# Patient Record
Sex: MALE | Race: WHITE | Employment: FULL TIME | URBAN - NONMETROPOLITAN AREA
[De-identification: names, ages, dates, MRNs, and addresses within clinical notes are randomized per-mention and may not be internally consistent; named-entity substitution may affect disease eponyms.]

---

## 2016-12-09 LAB
BUN BLDV-MCNC: NORMAL MG/DL
CALCIUM SERPL-MCNC: NORMAL MG/DL
CHLORIDE BLD-SCNC: NORMAL MMOL/L
CHOLESTEROL, TOTAL: 198 MG/DL
CHOLESTEROL/HDL RATIO: 4
CO2: NORMAL MMOL/L
CREAT SERPL-MCNC: NORMAL MG/DL
GFR CALCULATED: NORMAL
GLUCOSE BLD-MCNC: 99 MG/DL
HDLC SERPL-MCNC: 59 MG/DL (ref 35–70)
LDL CHOLESTEROL CALCULATED: 134 MG/DL (ref 0–160)
POTASSIUM SERPL-SCNC: NORMAL MMOL/L
SODIUM BLD-SCNC: NORMAL MMOL/L
TRIGL SERPL-MCNC: 70 MG/DL
VLDLC SERPL CALC-MCNC: 14 MG/DL

## 2017-06-26 VITALS
DIASTOLIC BLOOD PRESSURE: 86 MMHG | SYSTOLIC BLOOD PRESSURE: 144 MMHG | HEART RATE: 68 BPM | HEIGHT: 69 IN | WEIGHT: 212 LBS | BODY MASS INDEX: 31.4 KG/M2

## 2017-06-26 DIAGNOSIS — C11.1 CANCER OF PHARYNGEAL TONSIL (HCC): ICD-10-CM

## 2017-06-26 DIAGNOSIS — E78.2 MIXED HYPERLIPIDEMIA: ICD-10-CM

## 2017-06-26 DIAGNOSIS — I10 UNSPECIFIED ESSENTIAL HYPERTENSION: ICD-10-CM

## 2017-06-26 DIAGNOSIS — I34.0 NON-RHEUMATIC MITRAL REGURGITATION: ICD-10-CM

## 2017-06-26 RX ORDER — AMLODIPINE BESYLATE 10 MG/1
10 TABLET ORAL NIGHTLY
COMMUNITY
End: 2017-12-11 | Stop reason: SDUPTHER

## 2017-06-26 RX ORDER — METOPROLOL TARTRATE 50 MG/1
50 TABLET, FILM COATED ORAL DAILY
COMMUNITY
End: 2018-10-29 | Stop reason: CLARIF

## 2017-06-26 RX ORDER — AZITHROMYCIN 250 MG/1
250 TABLET, FILM COATED ORAL DAILY
COMMUNITY
End: 2017-06-29 | Stop reason: ALTCHOICE

## 2017-06-26 RX ORDER — PREDNISONE 20 MG/1
20 TABLET ORAL DAILY
COMMUNITY
End: 2017-06-29 | Stop reason: ALTCHOICE

## 2017-06-29 ENCOUNTER — OFFICE VISIT (OUTPATIENT)
Dept: FAMILY MEDICINE CLINIC | Age: 57
End: 2017-06-29
Payer: COMMERCIAL

## 2017-06-29 VITALS
DIASTOLIC BLOOD PRESSURE: 86 MMHG | BODY MASS INDEX: 31.1 KG/M2 | WEIGHT: 210 LBS | HEART RATE: 48 BPM | SYSTOLIC BLOOD PRESSURE: 138 MMHG | HEIGHT: 69 IN

## 2017-06-29 DIAGNOSIS — I34.0 NON-RHEUMATIC MITRAL REGURGITATION: ICD-10-CM

## 2017-06-29 DIAGNOSIS — C11.1 CANCER OF PHARYNGEAL TONSIL (HCC): ICD-10-CM

## 2017-06-29 DIAGNOSIS — E78.2 MIXED HYPERLIPIDEMIA: ICD-10-CM

## 2017-06-29 DIAGNOSIS — I10 UNSPECIFIED ESSENTIAL HYPERTENSION: Primary | ICD-10-CM

## 2017-06-29 PROCEDURE — 99213 OFFICE O/P EST LOW 20 MIN: CPT | Performed by: FAMILY MEDICINE

## 2017-06-29 ASSESSMENT — PATIENT HEALTH QUESTIONNAIRE - PHQ9
1. LITTLE INTEREST OR PLEASURE IN DOING THINGS: 0
SUM OF ALL RESPONSES TO PHQ9 QUESTIONS 1 & 2: 0
SUM OF ALL RESPONSES TO PHQ QUESTIONS 1-9: 0
2. FEELING DOWN, DEPRESSED OR HOPELESS: 0

## 2017-07-03 ASSESSMENT — ENCOUNTER SYMPTOMS
WHEEZING: 0
ABDOMINAL DISTENTION: 0
CHEST TIGHTNESS: 0
SHORTNESS OF BREATH: 0
ABDOMINAL PAIN: 0
SORE THROAT: 0

## 2017-11-20 RX ORDER — METOPROLOL SUCCINATE 50 MG/1
TABLET, EXTENDED RELEASE ORAL
Qty: 90 TABLET | Refills: 3 | Status: SHIPPED | OUTPATIENT
Start: 2017-11-20 | End: 2018-10-29 | Stop reason: SDUPTHER

## 2017-12-11 DIAGNOSIS — I10 ESSENTIAL HYPERTENSION: Primary | ICD-10-CM

## 2017-12-11 RX ORDER — AMLODIPINE BESYLATE 10 MG/1
10 TABLET ORAL NIGHTLY
Qty: 90 TABLET | Refills: 3 | Status: SHIPPED | OUTPATIENT
Start: 2017-12-11 | End: 2018-10-29 | Stop reason: SDUPTHER

## 2017-12-11 NOTE — TELEPHONE ENCOUNTER
Shawnee Muñoz is calling to request a refill on the following medication(s):  Requested Prescriptions     Pending Prescriptions Disp Refills    amLODIPine (NORVASC) 10 MG tablet 90 tablet 3     Sig: Take 1 tablet by mouth nightly       Last Visit Date (If Applicable):  8/68/8991    Next Visit Date:    Visit date not found

## 2018-10-24 LAB
A/G RATIO: 1.6 RATIO
AGE FOR GFR: 59
ALBUMIN: 4.2 G/DL
ALK PHOSPHATASE: 84 UNITS/L
ALT SERPL-CCNC: 51 UNITS/L
ANION GAP SERPL CALCULATED.3IONS-SCNC: 12 MMOL/L
AST SERPL-CCNC: 24 UNITS/L
BASOPHILS # BLD: 0.1 THOU/MM3
BILIRUB SERPL-MCNC: 0.5 MG/DL
BUN BLDV-MCNC: 10 MG/DL
CALCIUM SERPL-MCNC: 9 MG/DL
CHLORIDE BLD-SCNC: 105 MMOL/L
CHOLESTEROL/HDL RATIO: 4.3 RATIO
CHOLESTEROL: 212 MG/DL
CO2: 29 MMOL/L
CREAT SERPL-MCNC: 0.8 MG/DL
DIFFERENTIAL: AUTOMATED DIFF
EGFR BF: 89 ML/MIN/1.73 M2
EGFR BM: 120 ML/MIN/1.73 M2
EGFR WF: 73 ML/MIN/1.73 M2
EGFR WM: 99 ML/MIN/1.73 M2
EOSINOPHIL # BLD: 0.21 THOU/MM3
GLOBULIN: 2.7 G/DL
GLUCOSE: 109 MG/DL
HCT VFR BLD CALC: 47.9 %
HDL, DIRECT: 49 MG/DL
HEMOGLOBIN: 16.5 G/DL
LDL CHOLESTEROL CALCULATED: 137.8 MG/DL
LYMPHOCYTES # BLD: 1.7 THOU/MM3
MCH RBC QN AUTO: 31.6 PG
MCHC RBC AUTO-ENTMCNC: 34.4 G/DL
MCV RBC AUTO: 91.6 FL
MONOCYTES # BLD: 0.56 THOU/MM3
NEUTROPHILS: 3.46 THOU/MM3
PDW BLD-RTO: 11.5 %
PLATELET # BLD: 278 THOU/MM3
PMV BLD AUTO: 8.3 FL
POTASSIUM SERPL-SCNC: 3.6 MMOL/L
RBC # BLD: 5.23 M/UL
SODIUM BLD-SCNC: 142 MMOL/L
TOTAL PROTEIN: 6.9 G/DL
TRIGL SERPL-MCNC: 126 MG/DL
VLDLC SERPL CALC-MCNC: 25 MG/DL
WBC # BLD: 6.02 THOU/ML3

## 2018-10-29 ENCOUNTER — OFFICE VISIT (OUTPATIENT)
Dept: FAMILY MEDICINE CLINIC | Age: 58
End: 2018-10-29
Payer: COMMERCIAL

## 2018-10-29 VITALS
HEIGHT: 69 IN | SYSTOLIC BLOOD PRESSURE: 122 MMHG | WEIGHT: 208 LBS | BODY MASS INDEX: 30.81 KG/M2 | OXYGEN SATURATION: 92 % | DIASTOLIC BLOOD PRESSURE: 86 MMHG | HEART RATE: 78 BPM

## 2018-10-29 DIAGNOSIS — M77.11 LATERAL EPICONDYLITIS OF RIGHT ELBOW: ICD-10-CM

## 2018-10-29 DIAGNOSIS — I10 ESSENTIAL HYPERTENSION: Primary | ICD-10-CM

## 2018-10-29 DIAGNOSIS — E78.2 MIXED HYPERLIPIDEMIA: ICD-10-CM

## 2018-10-29 DIAGNOSIS — R73.01 IMPAIRED FASTING GLUCOSE: ICD-10-CM

## 2018-10-29 DIAGNOSIS — Z11.59 NEED FOR HEPATITIS C SCREENING TEST: ICD-10-CM

## 2018-10-29 DIAGNOSIS — Z92.3 HISTORY OF RADIATION TO HEAD AND NECK REGION: ICD-10-CM

## 2018-10-29 DIAGNOSIS — I34.0 NON-RHEUMATIC MITRAL REGURGITATION: ICD-10-CM

## 2018-10-29 DIAGNOSIS — C11.1 CANCER OF PHARYNGEAL TONSIL (HCC): ICD-10-CM

## 2018-10-29 LAB
HBA1C MFR BLD: 5.4 %
HEPATITIS C IGG: NORMAL
SIGNAL/CUTOFF: NORMAL
TSH REFLEX FT4: 1.02 MIU/ML

## 2018-10-29 PROCEDURE — 99214 OFFICE O/P EST MOD 30 MIN: CPT | Performed by: FAMILY MEDICINE

## 2018-10-29 PROCEDURE — 83036 HEMOGLOBIN GLYCOSYLATED A1C: CPT | Performed by: FAMILY MEDICINE

## 2018-10-29 RX ORDER — METOPROLOL SUCCINATE 50 MG/1
TABLET, EXTENDED RELEASE ORAL
Qty: 90 TABLET | Refills: 3 | Status: SHIPPED | OUTPATIENT
Start: 2018-10-29 | End: 2018-11-09 | Stop reason: SDUPTHER

## 2018-10-29 RX ORDER — AMLODIPINE BESYLATE 10 MG/1
10 TABLET ORAL NIGHTLY
Qty: 90 TABLET | Refills: 3 | Status: SHIPPED | OUTPATIENT
Start: 2018-10-29 | End: 2019-02-25 | Stop reason: SDUPTHER

## 2018-10-29 ASSESSMENT — PATIENT HEALTH QUESTIONNAIRE - PHQ9
SUM OF ALL RESPONSES TO PHQ QUESTIONS 1-9: 0
SUM OF ALL RESPONSES TO PHQ QUESTIONS 1-9: 0
2. FEELING DOWN, DEPRESSED OR HOPELESS: 0
SUM OF ALL RESPONSES TO PHQ9 QUESTIONS 1 & 2: 0
1. LITTLE INTEREST OR PLEASURE IN DOING THINGS: 0

## 2018-10-30 DIAGNOSIS — Z11.59 NEED FOR HEPATITIS C SCREENING TEST: ICD-10-CM

## 2018-10-31 NOTE — PROGRESS NOTES
[facility]  [address]  Dept: [phone]  Dept Fax: [phone]    Dev Agudelo is a 59 y.o. male who presents today for his medical conditions/complaints as noted below. Dev Agudelo is c/o of Annual Exam (pt c/o right forearm pain states tendon on top is sore, otherwise no issues or complaints.); Discuss Labs; Hyperlipidemia (denies any issues); and Hypertension      HPI:     The patient is 59years old, here for routine checkup. He comes in yearly. Hypertension  He remains on amlodipine 10 mg and metoprolol succinate 50 mg. His blood pressure for us is 122/86. He does not check. He has no chest pain or chest pressure. No palpitations. No edema. No orthopnea or paroxysmal nocturnal dyspnea. Hyperlipidemia  His cholesterol is not treated. He did another cholesterol for us with a total cholesterol 212, HDL 49, . He is asymptomatic. In the past, we had considered treating this but he has always deferred. He tries to watch his diet and exercise but does not get much regular exercise other than walking a dog. 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 He has been released by ENT and by Radiation Oncology. He has no symptoms. No sore throat. He has no complaints of neck pain or swelling in his neck. No swollen glands. No difficulty swallowing. No night sweats, fevers or chills. He is also complaining of some right elbow pain. Insidious onset. Exquisite pain with arm extended and wrist dorsiflexed    Current Outpatient Prescriptions   Medication Sig Dispense Refill    amLODIPine (NORVASC) 10 MG tablet Take 1 tablet by mouth nightly 90 tablet 3    metoprolol succinate (TOPROL XL) 50 MG extended release tablet TAKE 1 TABLET ONCE DAILY 90 tablet 3     No current facility-administered medications for this visit.       Allergies   Allergen Reactions    No Known Allergies        Past Medical History:   Diagnosis Date    Hyperlipidemia     Hypertension     SCC (squamous cell carcinoma) 08/2011    R Tonsil      Past Surgical History:   Procedure Laterality Date    COLONOSCOPY  01/2009    Dr Alessandra Arce Normal    TONSILLECTOMY Right 08/10/2011    R Tonsil Dr Komal Melgoza     Family History   Problem Relation Age of Onset    Osteoporosis Mother     Coronary Art Dis Father      Social History   Substance Use Topics    Smoking status: Never Smoker    Smokeless tobacco: Never Used    Alcohol use No        Health Maintenance   Topic Date Due    HIV screen  10/30/1973    DTaP/Tdap/Td vaccine (1 - Tdap) 10/30/1977    Shingles Vaccine (1 of 2 - 2 Dose Series) 01/12/2014    Colon cancer screen colonoscopy  01/15/2019    Potassium monitoring  10/24/2019    Creatinine monitoring  10/24/2019    Diabetes screen  10/29/2021    Lipid screen  10/24/2023    Flu vaccine  Completed    Hepatitis C screen  Completed       BP Readings from Last 3 Encounters:   10/29/18 122/86   06/29/17 138/86   12/20/16 (!) 144/86          (Goal 120/80)    Lab Results   Component Value Date    K 3.6 10/24/2018    CREATININE 0.8 10/24/2018    AST 24 10/24/2018    ALT 51 10/24/2018    HCT 47.9 10/24/2018    LABA1C 5.4 10/29/2018    GLUCOSE 109 10/24/2018    CALCIUM 9.0 10/24/2018      Lab Results   Component Value Date    CHOL 212 10/24/2018    CHOL 198 12/09/2016    TRIG 126 10/24/2018    HDL 59 12/09/2016       Review of Systems:      Review of Systems   Constitutional: Negative for chills, diaphoresis and fever. HENT: Negative for mouth sores, sore throat, trouble swallowing and voice change. Respiratory: Negative for chest tightness, shortness of breath and wheezing. Cardiovascular: Negative for chest pain, palpitations and leg swelling. Gastrointestinal: Negative for abdominal distention, abdominal pain and blood in stool. Genitourinary: Negative for difficulty urinating, dysuria and hematuria.    Hematological:

## 2018-11-02 ASSESSMENT — ENCOUNTER SYMPTOMS
VOICE CHANGE: 0
ABDOMINAL PAIN: 0
CHEST TIGHTNESS: 0
WHEEZING: 0
TROUBLE SWALLOWING: 0
SHORTNESS OF BREATH: 0
SORE THROAT: 0
ABDOMINAL DISTENTION: 0
BLOOD IN STOOL: 0

## 2018-11-09 DIAGNOSIS — I10 ESSENTIAL HYPERTENSION: ICD-10-CM

## 2018-11-09 RX ORDER — METOPROLOL SUCCINATE 50 MG/1
TABLET, EXTENDED RELEASE ORAL
Qty: 90 TABLET | Refills: 3 | Status: SHIPPED | OUTPATIENT
Start: 2018-11-09 | End: 2020-01-06

## 2019-02-25 DIAGNOSIS — I10 ESSENTIAL HYPERTENSION: ICD-10-CM

## 2019-02-25 RX ORDER — AMLODIPINE BESYLATE 10 MG/1
10 TABLET ORAL NIGHTLY
Qty: 90 TABLET | Refills: 3 | Status: SHIPPED | OUTPATIENT
Start: 2019-02-25 | End: 2020-02-24

## 2019-08-06 LAB
A/G RATIO: 1.7 RATIO
AGE FOR GFR: 60
ALBUMIN: 4.5 G/DL (ref 3.5–5)
ALK PHOSPHATASE: 93 UNITS/L (ref 38–126)
ALT SERPL-CCNC: 39 UNITS/L (ref 21–72)
ANION GAP SERPL CALCULATED.3IONS-SCNC: 13 MMOL/L
AST SERPL-CCNC: 25 UNITS/L (ref 17–59)
BASOPHILS # BLD: 0.09 THOU/MM3 (ref 0–0.3)
BILIRUB SERPL-MCNC: 0.7 MG/DL (ref 0.2–1.3)
BUN BLDV-MCNC: 12 MG/DL (ref 9–20)
CALCIUM SERPL-MCNC: 8.9 MG/DL (ref 8.4–10.2)
CHLORIDE BLD-SCNC: 106 MMOL/L (ref 98–120)
CHOLESTEROL/HDL RATIO: 4.2 RATIO (ref 0–4.5)
CHOLESTEROL: 227 MG/DL (ref 50–200)
CO2: 27 MMOL/L (ref 22–31)
CREAT SERPL-MCNC: 0.8 MG/DL (ref 0.7–1.3)
DIFFERENTIAL: AUTOMATED DIFF
EGFR BF: 89 ML/MIN/1.73 M2
EGFR BM: 120 ML/MIN/1.73 M2
EGFR WF: 73 ML/MIN/1.73 M2
EGFR WM: 99 ML/MIN/1.73 M2
EOSINOPHIL # BLD: 0.23 THOU/MM3 (ref 0–1.1)
GLOBULIN: 2.6 G/DL
GLUCOSE: 108 MG/DL (ref 75–110)
HCT VFR BLD CALC: 48.3 % (ref 42–52)
HDL, DIRECT: 54 MG/DL (ref 36–68)
HEMOGLOBIN: 16.2 G/DL (ref 13.8–17)
LDL CHOLESTEROL CALCULATED: 150.6 MG/DL (ref 0–160)
LYMPHOCYTES # BLD: 1.69 THOU/MM3 (ref 1–5.5)
MCH RBC QN AUTO: 30.9 PG (ref 28.5–32)
MCHC RBC AUTO-ENTMCNC: 33.6 G/DL (ref 32–37)
MCV RBC AUTO: 91.9 FL (ref 80–94)
MONOCYTES # BLD: 0.55 THOU/MM3 (ref 0.1–1)
NEUTROPHILS: 3.52 THOU/MM3 (ref 2–8.1)
PDW BLD-RTO: 11.4 % (ref 10–15)
PLATELET # BLD: 256 THOU/MM3 (ref 130–400)
PMV BLD AUTO: 8.2 FL (ref 7.4–11)
POTASSIUM SERPL-SCNC: 3.5 MMOL/L (ref 3.6–5)
RBC # BLD: 5.26 M/UL (ref 4.7–6.1)
SODIUM BLD-SCNC: 142 MMOL/L (ref 135–145)
TOTAL PROTEIN: 7.1 G/DL (ref 6.3–8.2)
TRIGL SERPL-MCNC: 112 MG/DL (ref 10–250)
VLDLC SERPL CALC-MCNC: 22 MG/DL (ref 0–40)
WBC # BLD: 6.08 THOU/ML3 (ref 4.8–10)

## 2019-08-11 ASSESSMENT — ENCOUNTER SYMPTOMS
WHEEZING: 0
TROUBLE SWALLOWING: 0
BLOOD IN STOOL: 0
VOICE CHANGE: 0
SORE THROAT: 0
ABDOMINAL PAIN: 0
SHORTNESS OF BREATH: 0
ABDOMINAL DISTENTION: 0
CHEST TIGHTNESS: 0

## 2019-08-12 ENCOUNTER — OFFICE VISIT (OUTPATIENT)
Dept: FAMILY MEDICINE CLINIC | Age: 59
End: 2019-08-12
Payer: COMMERCIAL

## 2019-08-12 VITALS
WEIGHT: 211 LBS | OXYGEN SATURATION: 98 % | DIASTOLIC BLOOD PRESSURE: 80 MMHG | HEART RATE: 62 BPM | SYSTOLIC BLOOD PRESSURE: 136 MMHG | BODY MASS INDEX: 31.16 KG/M2

## 2019-08-12 DIAGNOSIS — I10 ESSENTIAL HYPERTENSION: Primary | ICD-10-CM

## 2019-08-12 DIAGNOSIS — R73.01 IMPAIRED FASTING GLUCOSE: ICD-10-CM

## 2019-08-12 DIAGNOSIS — I34.0 NON-RHEUMATIC MITRAL REGURGITATION: ICD-10-CM

## 2019-08-12 DIAGNOSIS — C11.1 CANCER OF PHARYNGEAL TONSIL (HCC): ICD-10-CM

## 2019-08-12 DIAGNOSIS — Z12.11 ENCOUNTER FOR SCREENING COLONOSCOPY: ICD-10-CM

## 2019-08-12 DIAGNOSIS — E78.2 MIXED HYPERLIPIDEMIA: ICD-10-CM

## 2019-08-12 DIAGNOSIS — Z23 NEED FOR TETANUS BOOSTER: ICD-10-CM

## 2019-08-12 LAB — HBA1C MFR BLD: 5.4 %

## 2019-08-12 PROCEDURE — 90471 IMMUNIZATION ADMIN: CPT | Performed by: FAMILY MEDICINE

## 2019-08-12 PROCEDURE — 90715 TDAP VACCINE 7 YRS/> IM: CPT | Performed by: FAMILY MEDICINE

## 2019-08-12 PROCEDURE — 99214 OFFICE O/P EST MOD 30 MIN: CPT | Performed by: FAMILY MEDICINE

## 2019-08-12 PROCEDURE — 83036 HEMOGLOBIN GLYCOSYLATED A1C: CPT | Performed by: FAMILY MEDICINE

## 2019-08-12 ASSESSMENT — PATIENT HEALTH QUESTIONNAIRE - PHQ9
SUM OF ALL RESPONSES TO PHQ QUESTIONS 1-9: 0
SUM OF ALL RESPONSES TO PHQ9 QUESTIONS 1 & 2: 0
1. LITTLE INTEREST OR PLEASURE IN DOING THINGS: 0
SUM OF ALL RESPONSES TO PHQ QUESTIONS 1-9: 0
2. FEELING DOWN, DEPRESSED OR HOPELESS: 0

## 2019-09-16 ENCOUNTER — CLINICAL DOCUMENTATION (OUTPATIENT)
Dept: OTHER | Facility: CLINIC | Age: 59
End: 2019-09-16

## 2019-09-17 LAB — PATHOLOGY REPORT: NORMAL

## 2019-09-24 ENCOUNTER — OFFICE VISIT (OUTPATIENT)
Dept: SURGERY | Age: 59
End: 2019-09-24
Payer: COMMERCIAL

## 2019-09-24 VITALS
TEMPERATURE: 98.9 F | DIASTOLIC BLOOD PRESSURE: 95 MMHG | BODY MASS INDEX: 30.86 KG/M2 | HEART RATE: 66 BPM | SYSTOLIC BLOOD PRESSURE: 160 MMHG | WEIGHT: 209 LBS

## 2019-09-24 DIAGNOSIS — K62.1 RECTAL POLYP: Primary | ICD-10-CM

## 2019-09-24 PROCEDURE — 99213 OFFICE O/P EST LOW 20 MIN: CPT | Performed by: SURGERY

## 2019-09-24 NOTE — PROGRESS NOTES
Cholo Moses is a 59 y.o. male who presents today for colonoscopy. Patient underwent colonoscopy 1 week ago for screening purposes and was found to have 2 polyps in the colon as well as a low rectal mass. The colon polyps were removed and biopsies were taken of the rectal mass. Pathology returned as one tubular adenoma, one hyperplastic polyp in the rectal mass on pathology showed as a juvenile polyp. He returns the office today to discuss the results and for further planning. Since last being seen he denies any new issues or problems. Past Medical History:   Diagnosis Date    Hyperlipidemia     Hypertension     SCC (squamous cell carcinoma) 08/2011    R Tonsil       Past Surgical History:   Procedure Laterality Date    COLONOSCOPY  01/2009    Dr Olga Knox Normal    COLONOSCOPY  09/16/2019    TONSILLECTOMY Right 08/10/2011    R Tonsil Dr Naresh Jiménez       Current Outpatient Medications   Medication Sig Dispense Refill    amLODIPine (NORVASC) 10 MG tablet Take 1 tablet by mouth nightly 90 tablet 3    metoprolol succinate (TOPROL XL) 50 MG extended release tablet TAKE 1 TABLET ONCE DAILY 90 tablet 3     No current facility-administered medications for this visit.         Allergies   Allergen Reactions    No Known Allergies        Family History   Problem Relation Age of Onset    Osteoporosis Mother     Coronary Art Dis Father     Heart Disease Father     Heart Attack Father        Social History     Socioeconomic History    Marital status:      Spouse name: Not on file    Number of children: Not on file    Years of education: Not on file    Highest education level: Not on file   Occupational History    Not on file   Social Needs    Financial resource strain: Not on file    Food insecurity:     Worry: Not on file     Inability: Not on file    Transportation needs:     Medical: Not on file     Non-medical: Not on file   Tobacco Use    Smoking status: Never Smoker    Smokeless

## 2020-01-06 RX ORDER — METOPROLOL SUCCINATE 50 MG/1
TABLET, EXTENDED RELEASE ORAL
Qty: 90 TABLET | Refills: 3 | Status: SHIPPED | OUTPATIENT
Start: 2020-01-06 | End: 2021-02-03 | Stop reason: SDUPTHER

## 2020-01-06 NOTE — TELEPHONE ENCOUNTER
Kamini Garcia is calling to request a refill on the following medication(s):  Requested Prescriptions     Pending Prescriptions Disp Refills    metoprolol succinate (TOPROL XL) 50 MG extended release tablet [Pharmacy Med Name: METOPROLO ER TAB SUC 50MG] 90 tablet 3     Sig: TAKE 1 TABLET ONCE DAILY       Last Visit Date (If Applicable):  1/96/8429    Next Visit Date:    2/13/2020

## 2020-02-13 ENCOUNTER — OFFICE VISIT (OUTPATIENT)
Dept: FAMILY MEDICINE CLINIC | Age: 60
End: 2020-02-13
Payer: COMMERCIAL

## 2020-02-13 VITALS
SYSTOLIC BLOOD PRESSURE: 126 MMHG | DIASTOLIC BLOOD PRESSURE: 82 MMHG | HEART RATE: 55 BPM | WEIGHT: 210 LBS | BODY MASS INDEX: 31.1 KG/M2 | HEIGHT: 69 IN | OXYGEN SATURATION: 98 %

## 2020-02-13 PROBLEM — R73.01 IMPAIRED FASTING GLUCOSE: Status: ACTIVE | Noted: 2020-02-13

## 2020-02-13 LAB — HBA1C MFR BLD: 5.5 %

## 2020-02-13 PROCEDURE — 99214 OFFICE O/P EST MOD 30 MIN: CPT | Performed by: FAMILY MEDICINE

## 2020-02-13 PROCEDURE — 83036 HEMOGLOBIN GLYCOSYLATED A1C: CPT | Performed by: FAMILY MEDICINE

## 2020-02-13 ASSESSMENT — ENCOUNTER SYMPTOMS
BLOOD IN STOOL: 0
TROUBLE SWALLOWING: 0
WHEEZING: 0
SORE THROAT: 0
VOICE CHANGE: 0
ABDOMINAL DISTENTION: 0
COUGH: 0
SHORTNESS OF BREATH: 0
ABDOMINAL PAIN: 0

## 2020-02-13 ASSESSMENT — PATIENT HEALTH QUESTIONNAIRE - PHQ9
SUM OF ALL RESPONSES TO PHQ QUESTIONS 1-9: 0
2. FEELING DOWN, DEPRESSED OR HOPELESS: 0
SUM OF ALL RESPONSES TO PHQ QUESTIONS 1-9: 0
SUM OF ALL RESPONSES TO PHQ9 QUESTIONS 1 & 2: 0
1. LITTLE INTEREST OR PLEASURE IN DOING THINGS: 0

## 2020-02-13 NOTE — PROGRESS NOTES
[address]  Dept: [phone]  DeptFax: [phone]    Lucinda Chi is a57 y.o. male who presents today for his medical conditions/complaints as noted below. Lucinda Chi is c/o of 6 Month Follow-Up (denies any issues does report an occasional right hip pain); Hypertension; Blood Sugar Problem; and Hyperlipidemia      HPI:     HPI    Hypertension  He remains on amlodipine 10 mg and metoprolol succinate 50 mg.  His blood pressure for us is  126/82. He does not check. [facility] has no chest pain or chest pressure. No palpitations. No orthopnea or paroxysmal nocturnal dyspnea.  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 But it goes down overnight.     Hyperlipidemia   his last lipid profile was in August 2018 which showed a total cholesterol of  227 , HDL 54 ,  .  He is asymptomatic.  In the past, we had considered treating this but he has always deferred. He tries to watch his diet and exercise. 6 months ago his estimated risk was around 11 to 12%. Impaired fasting glucose  He has had a fasting glucose of 108 but his hemoglobin A1c was 5.4 at last visit. Today's is 5.5 he has been trying to watch his diet. And also walk on the treadmill 30 minutes trying every day.     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 [facility] has been released by ENT and by Radiation Oncology. [facility] has no symptoms. No sore throat.  He has no complaints of neck pain or swelling in his neck. No swollen glands.  No difficulty swallowing. No night sweats, fevers or chills. Colonoscopy  Patient underwent a colonoscopy in September of last year. He was found to have a large juvenile polyp in the rectum. With Dr. Zander Carr    1. I had a discussion with Derik Jaquez today about the pathology results and that there was no evidence of cancer on pathology.   I also discussed with him that unfortunately due to the location and the size of the rectal polyp we were not able to completely remove it. I did discuss with him that while juvenile polyps have low risk of conversion to malignancy that there is some potential that that could happen. Additionally due to the fact that this was a fairly large polyp there would be concern for continued growth with eventual concern for partial obstruction. For these reasons I have recommended that he undergo transanal excision of the polyp at his convenience. Risks of the procedure including bleeding, infection, scarring, pain, damage to surrounding tissues including sphincter muscles, anal incontinence and anesthesia risk were discussed and consent is obtained. Patient subsequently underwent a transanal removal of a rectal polyp. In November 2019.   Pathology confirmed a juvenile polyp without evidence of malignancy    BP Readings from Last 3 Encounters:   02/13/20 126/82   09/24/19 (!) 160/95   08/12/19 136/80            (goal 120/80)    Past Medical History:   Diagnosis Date    Hyperlipidemia     Hypertension     Rectal polyp 09/2019    Dr. Lisy Panda polyp    SCC (squamous cell carcinoma) 08/2011    R Tonsil      Past Surgical History:   Procedure Laterality Date    COLONOSCOPY  01/2009    Dr Iron Haque Normal    COLONOSCOPY  09/16/2019    EXCISION OF AURAL MASS      TONSILLECTOMY Right 08/10/2011    R Tonsil Dr Keyanna Guillermo     Family History   Problem Relation Age of Onset    Osteoporosis Mother     Coronary Art Dis Father     Heart Disease Father     Heart Attack Father      Social History     Tobacco Use    Smoking status: Never Smoker    Smokeless tobacco: Never Used   Substance Use Topics    Alcohol use: Yes        Current Outpatient Medications   Medication Sig Dispense Refill    metoprolol succinate (TOPROL XL) 50 MG extended release tablet TAKE 1 TABLET ONCE DAILY 90 tablet 3    amLODIPine (NORVASC) 10 MG tablet Take Head: Normocephalic and atraumatic. Right Ear: Tympanic membrane normal.      Left Ear: Tympanic membrane normal.      Nose: Nose normal.      Mouth/Throat:      Pharynx: No posterior oropharyngeal erythema. Neck:      Musculoskeletal: Neck supple. Thyroid: No thyromegaly. Vascular: No carotid bruit. Cardiovascular:      Rate and Rhythm: Normal rate and regular rhythm. Heart sounds: Normal heart sounds, S1 normal and S2 normal. No murmur. Pulmonary:      Breath sounds: Normal breath sounds. No wheezing, rhonchi or rales. Abdominal:      General: Bowel sounds are normal.      Palpations: Abdomen is soft. Tenderness: There is no abdominal tenderness. Musculoskeletal: Normal range of motion. Right forearm: He exhibits tenderness. He exhibits no bony tenderness, no swelling, no edema and no deformity. Lymphadenopathy:      Cervical: No cervical adenopathy. Skin:     Findings: No rash. Assessment:      Diagnosis Orders   1. Essential hypertension  CBC Auto Differential    Comprehensive Metabolic Panel, Fasting   2. Cancer of pharyngeal tonsil ([address]     3. Mixed hyperlipidemia  Lipid Panel   4. Impaired fasting glucose  POCT glycosylated hemoglobin (Hb A1C)   5. 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 Screening PSA (prostate specific antigen)  PSA Screening            POC Testing Results (If Applicable):  Results for POC orders placed in visit on 02/13/20   POCT glycosylated hemoglobin (Hb A1C)   Result Value Ref Range    Hemoglobin A1C 5.5 %       Plan:     Continue current medications and lifestyle changes. We will get labs in 6 months CBC CMP lipid panel and will add a screening PSA. His dependent edema seems benign. He might try some compression stockings over-the-counter. Otherwise he will return the office  in 6 months.   Sooner if any problems    Orders Given:  Orders Placed This Encounter Procedures    CBC Auto Differential     Standing Status:   Future     Standing Expiration Date:   2/12/2021    Comprehensive Metabolic Panel, Fasting     Standing Status:   Future     Standing Expiration Date:   2/12/2021    Lipid Panel     Standing Status:   Future     Standing Expiration Date:   2/12/2021     Order Specific Question:   Is Patient Fasting?/# of Hours     Answer:   Yes, 12 hours    PSA Screening     Standing Status:   Future     Standing Expiration Date:   2/13/2021    POCT glycosylated hemoglobin (Hb A1C)     Prescriptions:    No orders of the defined types were placed in this encounter. Return in about 6 months (around 8/13/2020). Electronically signed by Blu Soler MD on2/13/2020. **This report has been created using voice recognition software. It may contain minor errors which are inherent in voice recognition technology. **

## 2020-02-24 RX ORDER — AMLODIPINE BESYLATE 10 MG/1
TABLET ORAL
Qty: 90 TABLET | Refills: 3 | Status: SHIPPED | OUTPATIENT
Start: 2020-02-24 | End: 2021-03-18 | Stop reason: SDUPTHER

## 2020-04-07 ENCOUNTER — OFFICE VISIT (OUTPATIENT)
Dept: FAMILY MEDICINE CLINIC | Age: 60
End: 2020-04-07
Payer: COMMERCIAL

## 2020-04-07 VITALS
WEIGHT: 210 LBS | HEART RATE: 69 BPM | OXYGEN SATURATION: 98 % | SYSTOLIC BLOOD PRESSURE: 136 MMHG | DIASTOLIC BLOOD PRESSURE: 98 MMHG | BODY MASS INDEX: 31.01 KG/M2

## 2020-04-07 PROCEDURE — 99213 OFFICE O/P EST LOW 20 MIN: CPT | Performed by: FAMILY MEDICINE

## 2020-04-07 RX ORDER — IBUPROFEN 200 MG
TABLET ORAL
Qty: 120 TABLET | Refills: 3 | COMMUNITY
Start: 2020-04-07 | End: 2021-03-24 | Stop reason: ALTCHOICE

## 2020-04-07 RX ORDER — CAMPHOR, MENTHOL, AND METHYL SALICYLATE 7.1; 33; 36 MG/1; MG/1; MG/1
PATCH TOPICAL
Qty: 10 PATCH | Refills: 0 | COMMUNITY
Start: 2020-04-07 | End: 2021-02-03 | Stop reason: ALTCHOICE

## 2020-04-07 RX ORDER — CYCLOBENZAPRINE HCL 10 MG
10 TABLET ORAL 3 TIMES DAILY PRN
Qty: 30 TABLET | Refills: 0 | Status: SHIPPED | OUTPATIENT
Start: 2020-04-07 | End: 2020-04-17

## 2020-04-07 ASSESSMENT — ENCOUNTER SYMPTOMS
RESPIRATORY NEGATIVE: 1
NAUSEA: 0
CONSTIPATION: 0
VOMITING: 0
BACK PAIN: 1
ABDOMINAL PAIN: 0
BLOOD IN STOOL: 0

## 2021-02-03 ENCOUNTER — OFFICE VISIT (OUTPATIENT)
Dept: FAMILY MEDICINE CLINIC | Age: 61
End: 2021-02-03
Payer: COMMERCIAL

## 2021-02-03 VITALS
SYSTOLIC BLOOD PRESSURE: 130 MMHG | WEIGHT: 216 LBS | HEIGHT: 69 IN | OXYGEN SATURATION: 98 % | DIASTOLIC BLOOD PRESSURE: 88 MMHG | BODY MASS INDEX: 31.99 KG/M2 | HEART RATE: 70 BPM

## 2021-02-03 DIAGNOSIS — M25.551 CHRONIC HIP PAIN, RIGHT: ICD-10-CM

## 2021-02-03 DIAGNOSIS — Z13.1 SCREENING FOR DIABETES MELLITUS: ICD-10-CM

## 2021-02-03 DIAGNOSIS — G89.29 CHRONIC RIGHT SHOULDER PAIN: ICD-10-CM

## 2021-02-03 DIAGNOSIS — M25.511 CHRONIC RIGHT SHOULDER PAIN: ICD-10-CM

## 2021-02-03 DIAGNOSIS — I10 ESSENTIAL HYPERTENSION: Primary | ICD-10-CM

## 2021-02-03 DIAGNOSIS — G89.29 CHRONIC HIP PAIN, RIGHT: ICD-10-CM

## 2021-02-03 DIAGNOSIS — E78.2 MIXED HYPERLIPIDEMIA: ICD-10-CM

## 2021-02-03 DIAGNOSIS — R73.01 IMPAIRED FASTING GLUCOSE: ICD-10-CM

## 2021-02-03 DIAGNOSIS — C11.1 CANCER OF PHARYNGEAL TONSIL (HCC): ICD-10-CM

## 2021-02-03 DIAGNOSIS — Z13.220 SCREENING, LIPID: ICD-10-CM

## 2021-02-03 PROCEDURE — 99214 OFFICE O/P EST MOD 30 MIN: CPT | Performed by: FAMILY MEDICINE

## 2021-02-03 RX ORDER — METOPROLOL SUCCINATE 100 MG/1
TABLET, EXTENDED RELEASE ORAL
Qty: 90 TABLET | Refills: 1 | Status: SHIPPED | OUTPATIENT
Start: 2021-02-03 | End: 2021-08-02 | Stop reason: SDUPTHER

## 2021-02-03 SDOH — ECONOMIC STABILITY: FOOD INSECURITY: WITHIN THE PAST 12 MONTHS, YOU WORRIED THAT YOUR FOOD WOULD RUN OUT BEFORE YOU GOT MONEY TO BUY MORE.: NEVER TRUE

## 2021-02-03 SDOH — ECONOMIC STABILITY: TRANSPORTATION INSECURITY
IN THE PAST 12 MONTHS, HAS THE LACK OF TRANSPORTATION KEPT YOU FROM MEDICAL APPOINTMENTS OR FROM GETTING MEDICATIONS?: NO

## 2021-02-03 ASSESSMENT — PATIENT HEALTH QUESTIONNAIRE - PHQ9
2. FEELING DOWN, DEPRESSED OR HOPELESS: 0
SUM OF ALL RESPONSES TO PHQ QUESTIONS 1-9: 0
SUM OF ALL RESPONSES TO PHQ QUESTIONS 1-9: 0
1. LITTLE INTEREST OR PLEASURE IN DOING THINGS: 0

## 2021-02-03 ASSESSMENT — ENCOUNTER SYMPTOMS: SHORTNESS OF BREATH: 0

## 2021-02-03 NOTE — PROGRESS NOTES
[address] [address]  Dept: [phone]  Dept Fax: [phone]    Aric Patricio is a 56 y.o. male who presents today for his medical conditions/complaints as noted below. Aric Patricio c/o of Establish Care      HPI:     HPI  Pt here to get established, prior pt of Dr Urbano Zavala now retired  Care for HTN, hyperlipidemia, impaired fasting glucose prior. Leg swelling noted if standing long, more on feet a lot or on airplane. Has compression stockings to use    Right hip and shoulder pains noted intermittantly, no active treatments attempted. Noting more sedentary and more weight gain after cancer treatment- doing well remission over 2 years    Left handed, active with right. Maternal aunts x 2 with severe arthritis. BP Readings from Last 3 Encounters:   02/03/21 130/88   04/07/20 (!) 136/98   02/13/20 126/82          (goal 120/80)    Past Medical History:   Diagnosis Date    Hyperlipidemia     Hypertension     Rectal polyp 09/2019    Dr. Hanley Molt polyp    SCC (squamous cell carcinoma) 08/2011    R Tonsil      Past Surgical History:   Procedure Laterality Date    COLONOSCOPY  01/2009    Dr Mari Bailey Normal    COLONOSCOPY  09/16/2019    EXCISION OF AURAL MASS  03/2020    TONSILLECTOMY Right 08/10/2011    R Tonsil Dr Lillie Perkins       Family History   Problem Relation Age of Onset    Osteoporosis Mother     Dementia Mother     Coronary Art Dis Father     Heart Disease Father     Heart Attack Father     Blindness Paternal Grandfather 76       Social History     Tobacco Use    Smoking status: Never Smoker    Smokeless tobacco: Never Used   Substance Use Topics    Alcohol use: Yes      Prior to Visit Medications    Medication Sig Taking?  Authorizing Provider   ibuprofen (ADVIL;MOTRIN) 200 MG tablet Up to 4 tablets 3 times / day  Deo Braswell MD Vascular: No carotid bruit. Cardiovascular:      Rate and Rhythm: Normal rate and regular rhythm. Heart sounds: No murmur. Pulmonary:      Effort: Pulmonary effort is normal. No respiratory distress. Musculoskeletal:         General: Tenderness (left shoulder normal, right mild rotator cuff tenderness anterior/posterior.) present. No swelling. Lymphadenopathy:      Cervical: No cervical adenopathy. Neurological:      Mental Status: He is alert. Psychiatric:         Thought Content: Thought content normal.         Judgment: Judgment normal.       Weight up 6 # from prior max of couple years  The 10-year ASCVD risk score (Irvin Galindo, et al., 2013) is: 12.7%    Values used to calculate the score:      Age: 56 years      Sex: Male      Is Non- : No      Diabetic: No      Tobacco smoker: No      Systolic Blood Pressure: 479 mmHg      Is BP treated: Yes      HDL Cholesterol: 54 mg/dL      Total Cholesterol: 227 mg/dL    Assessment:     1. Essential hypertension    2. Cancer of pharyngeal tonsil ([address]    3. Mixed hyperlipidemia    4. Impaired fasting glucose    5. Screening, lipid    6. Screening for diabetes mellitus    7. Chronic right shoulder pain    8. Chronic hip pain, right      No results found for this visit on 02/03/21.         Plan:     Orders Placed This Encounter   Procedures    XR SHOULDER RIGHT (MIN 2 VIEWS)     Standing Status:   Future     Standing Expiration Date:   2/3/2022    XR HIP 2-3 VW W PELVIS RIGHT     Standing Status:   Future     Standing Expiration Date:   2/3/2022    Lipid Panel     Standing Status:   Future     Standing Expiration Date:   2/3/2022     Order Specific Question:   Is Patient Fasting?/# of Hours     Answer:   10-12    Glucose, Fasting     Standing Status:   Future     Standing Expiration Date:   2/3/2022    Electrolyte Panel     Standing Status:   Future     Standing Expiration Date:   2/3/2022    Creatinine, Serum Standing Status:   Future     Standing Expiration Date:   2/3/2022   [facility] [place]     Referral Priority:   Routine     Referral Type:   Eval and Treat     Referral Reason:   Specialty Services Required     Referral Location:   [facility]     Requested Specialty:   Physical Therapy     Number of Visits Requested:   1       Return in about 1 month (around 3/3/2021) for HTN. Consider treating lipid with higher 10 year ASCVD score reviewed with pt. Patient Instructions   shingrix vaccine at pharmacy if desire       Discussed use, benefit, and side effects of prescribed medications. All patient questions answered. Pt voiced understanding. Reviewed health maintenance shingrix vaccine, lab encouraged. Instructed to continue current medications, diet and exercise. Patient agreed with treatment plan. Follow up as directed.      Electronically signed by Sidney Tenorio MD on 2/3/2021

## 2021-03-18 DIAGNOSIS — I10 ESSENTIAL HYPERTENSION: ICD-10-CM

## 2021-03-18 RX ORDER — AMLODIPINE BESYLATE 10 MG/1
TABLET ORAL
Qty: 90 TABLET | Refills: 1 | Status: SHIPPED | OUTPATIENT
Start: 2021-03-18 | End: 2021-09-02

## 2021-03-18 NOTE — TELEPHONE ENCOUNTER
Ashlie Pickens is requesting a refill on the following medication(s):  Requested Prescriptions     Pending Prescriptions Disp Refills    amLODIPine (NORVASC) 10 MG tablet 90 tablet 3       Last Visit Date (If Applicable):  Visit date not found    Next Visit Date:    Visit date not found

## 2021-03-24 ENCOUNTER — OFFICE VISIT (OUTPATIENT)
Dept: FAMILY MEDICINE CLINIC | Age: 61
End: 2021-03-24
Payer: COMMERCIAL

## 2021-03-24 VITALS
BODY MASS INDEX: 32.29 KG/M2 | HEART RATE: 60 BPM | WEIGHT: 218 LBS | DIASTOLIC BLOOD PRESSURE: 92 MMHG | OXYGEN SATURATION: 98 % | HEIGHT: 69 IN | SYSTOLIC BLOOD PRESSURE: 132 MMHG

## 2021-03-24 DIAGNOSIS — S22.31XA CLOSED FRACTURE OF ONE RIB OF RIGHT SIDE, INITIAL ENCOUNTER: Primary | ICD-10-CM

## 2021-03-24 PROCEDURE — 99213 OFFICE O/P EST LOW 20 MIN: CPT | Performed by: FAMILY MEDICINE

## 2021-03-24 RX ORDER — TRAMADOL HYDROCHLORIDE 50 MG/1
50 TABLET ORAL EVERY 6 HOURS PRN
Qty: 30 TABLET | Refills: 0 | Status: SHIPPED | OUTPATIENT
Start: 2021-03-24 | End: 2021-04-07

## 2021-03-24 RX ORDER — MELOXICAM 15 MG/1
15 TABLET ORAL DAILY PRN
Qty: 30 TABLET | Refills: 1 | Status: SHIPPED | OUTPATIENT
Start: 2021-03-24 | End: 2022-03-16

## 2021-03-24 ASSESSMENT — ENCOUNTER SYMPTOMS
BACK PAIN: 1
NAUSEA: 0
ABDOMINAL PAIN: 0
SHORTNESS OF BREATH: 1
VOMITING: 0
DIARRHEA: 0

## 2021-03-24 NOTE — PROGRESS NOTES
[address] [facility]  Dept: [phone]  Dept Fax:[phone]      Lina Emmanuel is a 56 y.o. male who presents today for his medical conditions/complaints as noted below. Chief Complaint   Patient presents with   [address] in garage       HPI:     HPI  Pt here with lower back pain after fall in garage off ladder working on . [employer] about 4 feet, landed on feet but went sideways and hit posterior lower back on mower tire. Following day seemed worse and then same Friday until while drying with towel on Friday night felt severe pain for over 15 minutes. Severe Saturday   Ok laying in bed except with rolling. Noting some shortness of breath, pain more with laughing, sneezing or coughing. Slowly improving until severe sneeze couple hours ago. Saw chiropracter yesterday. Ibuprofen using 12 pills per day 4 tabs 3 times daily. Prior to Visit Medications    Medication Sig Taking?  Authorizing Provider   amLODIPine (NORVASC) 10 MG tablet TAKE 1 TABLET NIGHTLY  Albina Burrell MD   metoprolol succinate (TOPROL XL) 100 MG extended release tablet TAKE 1 TABLET ONCE DAILY  Albina Burrell MD   ibuprofen (ADVIL;MOTRIN) 200 MG tablet Up to 4 tablets 3 times / day  Harry Marte MD       Allergies   Allergen Reactions    No Known Allergies        Past Medical History:   Diagnosis Date    Hyperlipidemia     Hypertension     Rectal polyp 09/2019    Dr. Ricks Ax polyp    SCC (squamous cell carcinoma) 08/2011    R Tonsil     Past Surgical History:   Procedure Laterality Date    COLONOSCOPY  01/2009    Dr Yuliya Queen Normal    COLONOSCOPY  09/16/2019    EXCISION OF AURAL MASS  03/2020    TONSILLECTOMY Right 08/10/2011    R Tonsil Dr Janneth Moreno     Social History     Socioeconomic History    Marital status:      Spouse name: Not on file    Number of children: Not on file    Years of education: Not on file   Heri Highest education level: Not on file   Occupational History    Not on file   Social Needs    Financial resource strain: Not hard at all    Food insecurity     Worry: Never true     Inability: Never true   [employer] needs     Medical: No     Non-medical: No   Tobacco Use    Smoking status: Never Smoker    Smokeless tobacco: Never Used   Substance and Sexual Activity    Alcohol use: Yes    Drug use: Not Currently    Sexual activity: Not on file   Lifestyle    Physical activity     Days per week: Not on file     Minutes per session: Not on file    Stress: Not on file   Relationships    Social connections     Talks on phone: Not on file     Gets together: Not on file     Attends Hinduism service: Not on file     Active member of club or organization: Not on file     Attends meetings of clubs or organizations: Not on file     Relationship status: Not on file    Intimate partner violence     Fear of current or ex partner: Not on file     Emotionally abused: Not on file     Physically abused: Not on file     Forced sexual activity: Not on file   Other Topics Concern    Not on file   Social History Narrative    Not on file     Family History   Problem Relation Age of Onset    Osteoporosis Mother     Dementia Mother     Coronary Art Dis Father     Heart Disease Father     Heart Attack Father     Blindness Paternal Grandfather 76       Subjective:      Review of Systems   Constitutional:        Pt states that when sneezing or coughing or laughing it is hard to breathe and the pain intensifies. Respiratory: Positive for shortness of breath (When the pain acts up, breathing is hard). Gastrointestinal: Negative for abdominal pain, diarrhea, nausea and vomiting. Genitourinary: Negative for difficulty urinating. Musculoskeletal: Positive for back pain. Negative for neck pain and neck stiffness. Neurological: Negative for dizziness and speech difficulty.        Objective:     BP (!) 132/92 (Site: Right Upper Arm, Position: Sitting, Cuff Size: Medium Adult)   Pulse 60   Ht 5' 9\" (1.753 m)   Wt 218 lb (98.9 kg)   SpO2 98%   BMI 32.19 kg/m²     Physical Exam  Vitals signs reviewed. HENT:      Head: Normocephalic. Eyes:      Conjunctiva/sclera: Conjunctivae normal.   Cardiovascular:      Rate and Rhythm: Normal rate. Pulmonary:      Effort: Pulmonary effort is normal. No respiratory distress. Breath sounds: No wheezing or rhonchi. Chest:      Chest wall: No tenderness. Musculoskeletal:         General: Tenderness (moderate right posterior lateral point tenderness along 10th rib.) present. Cervical back: Normal.      Thoracic back: Normal.      Lumbar back: Normal.   Neurological:      Mental Status: He is alert. Assessment:      Diagnosis Orders   1. Closed fracture of one rib of right side, initial encounter  meloxicam (MOBIC) 15 MG tablet    traMADol (ULTRAM) 50 MG tablet     No results found for this visit on 03/24/21.    :     No follow-ups on file. Patient Instructions   May get shingrix vaccine at pharmacy at least 1 month after covid vaccine if desired by pt  Encourage milk intake with vitamin D 3 times daily 1 cup each serving    No orders of the defined types were placed in this encounter.       Electronically signed by Meggan Navarro MD on 3/24/2021 at10:44 PM

## 2021-03-24 NOTE — PATIENT INSTRUCTIONS
May get shingrix vaccine at pharmacy at least 1 month after covid vaccine if desired by pt  Encourage milk intake with vitamin D 3 times daily 1 cup each serving

## 2021-05-27 LAB
ANION GAP SERPL CALCULATED.3IONS-SCNC: 9.4 MMOL/L
CHLORIDE BLD-SCNC: 107 MMOL/L (ref 98–120)
CHOLESTEROL/HDL RATIO: 4.76 RATIO (ref 0–4.5)
CHOLESTEROL: 233 MG/DL (ref 50–200)
CO2: 29 MMOL/L (ref 22–31)
CREAT SERPL-MCNC: 0.9 MG/DL (ref 0.7–1.3)
GFR CALCULATED: > 60
GLUCOSE: 127 MG/DL (ref 75–110)
HDLC SERPL-MCNC: 49 MG/DL (ref 36–68)
LDL CHOLESTEROL CALCULATED: 154 MG/DL (ref 0–160)
POTASSIUM SERPL-SCNC: 3.4 MMOL/L (ref 3.6–5)
SODIUM BLD-SCNC: 142 MMOL/L (ref 135–145)
TRIGL SERPL-MCNC: 150 MG/DL (ref 10–250)
VLDLC SERPL CALC-MCNC: 30 MG/DL (ref 0–50)

## 2021-06-02 ENCOUNTER — OFFICE VISIT (OUTPATIENT)
Dept: FAMILY MEDICINE CLINIC | Age: 61
End: 2021-06-02
Payer: COMMERCIAL

## 2021-06-02 VITALS
OXYGEN SATURATION: 98 % | SYSTOLIC BLOOD PRESSURE: 130 MMHG | HEIGHT: 69 IN | HEART RATE: 60 BPM | WEIGHT: 219 LBS | DIASTOLIC BLOOD PRESSURE: 80 MMHG | BODY MASS INDEX: 32.44 KG/M2 | RESPIRATION RATE: 20 BRPM

## 2021-06-02 DIAGNOSIS — M25.511 CHRONIC RIGHT SHOULDER PAIN: Primary | ICD-10-CM

## 2021-06-02 DIAGNOSIS — M25.551 CHRONIC HIP PAIN, RIGHT: ICD-10-CM

## 2021-06-02 DIAGNOSIS — I10 ESSENTIAL HYPERTENSION: ICD-10-CM

## 2021-06-02 DIAGNOSIS — E87.6 HYPOKALEMIA: ICD-10-CM

## 2021-06-02 DIAGNOSIS — R73.01 IMPAIRED FASTING GLUCOSE: ICD-10-CM

## 2021-06-02 DIAGNOSIS — G89.29 CHRONIC RIGHT SHOULDER PAIN: Primary | ICD-10-CM

## 2021-06-02 DIAGNOSIS — G89.29 CHRONIC HIP PAIN, RIGHT: ICD-10-CM

## 2021-06-02 DIAGNOSIS — E78.2 MIXED HYPERLIPIDEMIA: ICD-10-CM

## 2021-06-02 PROCEDURE — 99214 OFFICE O/P EST MOD 30 MIN: CPT | Performed by: FAMILY MEDICINE

## 2021-06-02 ASSESSMENT — ENCOUNTER SYMPTOMS: SHORTNESS OF BREATH: 0

## 2021-06-02 NOTE — PROGRESS NOTES
[address] [facility]  Dept: [phone]  Dept Fax:[phone]    Jony Holman is a 56 y.o. male who presents today for his medical conditions/complaints as noted below. Jony Holman is c/o of Hypertension (4 month f/u ) and Results (Xray results he just had done last week )      HPI:     HPI  Pt here for review of recent xrays accomplished on shoulder and hip  Shoulder pain more now than hip unless over active. Noted also to have had lab done last week also. Did not take meloxicam was using ibuprofen, not certain how it would affect her stomach.       BP Readings from Last 3 Encounters:   06/02/21 130/80   03/24/21 (!) 132/92   02/03/21 130/88          (goal 120/80)    Past Medical History:   Diagnosis Date    Hyperlipidemia     Hypertension     Rectal polyp 09/2019    Dr. Traci Matute polyp    SCC (squamous cell carcinoma) 08/2011    R Tonsil      Past Surgical History:   Procedure Laterality Date    COLONOSCOPY  01/2009    Dr Krissy Broussard Normal    COLONOSCOPY  09/16/2019    EXCISION OF AURAL MASS  03/2020    TONSILLECTOMY Right 08/10/2011    R Tonsil Dr Judy Pham       Family History   Problem Relation Age of Onset    Osteoporosis Mother     Dementia Mother     Coronary Art Dis Father     Heart Disease Father     Heart Attack Father     Blindness Paternal Grandfather 76       Social History     Tobacco Use    Smoking status: Never Smoker    Smokeless tobacco: Never Used   Substance Use Topics    Alcohol use: Yes      Current Outpatient Medications   Medication Sig Dispense Refill    amLODIPine (NORVASC) 10 MG tablet TAKE 1 TABLET NIGHTLY 90 tablet 1    metoprolol succinate (TOPROL XL) 100 MG extended release tablet TAKE 1 TABLET ONCE DAILY 90 tablet 1    meloxicam (MOBIC) 15 MG tablet Take 1 tablet by mouth daily as needed for Pain (Patient not taking: Reported on 6/2/2021) 30 tablet 1     No current Value Date    CHOL 233 (H) 05/27/2021    CHOL 227 (H) 08/06/2019    CHOL 212 (H) 10/24/2018     Lab Results   Component Value Date    TRIG 150 05/27/2021    TRIG 112 08/06/2019    TRIG 126 10/24/2018     Lab Results   Component Value Date    HDL 49 05/27/2021    HDL 59 12/09/2016     Lab Results   Component Value Date    LDLCALC 154.0 05/27/2021    LDLCALC 150.6 08/06/2019    LDLCALC 137.8 10/24/2018     Lab Results   Component Value Date    VLDL 30 05/27/2021    VLDL 22 08/06/2019    VLDL 25 10/24/2018     Lab Results   Component Value Date    CHOLHDLRATIO 4.76 (H) 05/27/2021    CHOLHDLRATIO 4.2 08/06/2019    CHOLHDLRATIO 4.3 10/24/2018     The 10-year ASCVD risk score (Rhianna Abdul, et al., 2013) is: 13.8%    Values used to calculate the score:      Age: 56 years      Sex: Male      Is Non- : No      Diabetic: No      Tobacco smoker: No      Systolic Blood Pressure: 698 mmHg      Is BP treated: Yes      HDL Cholesterol: 49 mg/dL      Total Cholesterol: 233 mg/dL      Assessment:      1. Essential hypertension    2. Mixed hyperlipidemia    3. Impaired fasting glucose    4. Chronic right shoulder pain    5. Chronic hip pain, right             Plan:     There are no Patient Instructions on file for this visit.   Orders Placed This Encounter   Procedures    Glucose, Fasting     Standing Status:   Future     Standing Expiration Date:   2/1/2022    Electrolyte Panel     Standing Status:   Future     Standing Expiration Date:   2/1/2022    Creatinine, Serum     Standing Status:   Future     Standing Expiration Date:   2/1/2022    Hemoglobin A1C     Standing Status:   Future     Standing Expiration Date:   2/1/2022    Lipid Panel     Standing Status:   Future     Standing Expiration Date:   2/1/2022     Order Specific Question:   Is Patient Fasting?/# of Hours     Answer:   10-12   [facility] - [place]     Referral Priority:   Routine     Referral Type:   Eval and Treat Referral Reason:   Specialty Services Required     Referral Location:   [facility]     Requested Specialty:   Physical Therapy     Number of Visits Requested:   1     No orders of the defined types were placed in this encounter. Return in about 4 months (around 10/2/2021) for lipid, glucose and arthritis. .    Increase high potassium foods such as bananas, cantaloupe and potato skins      Discussed use, benefit, and side effects of prescribed medications. All patient questions answered. Pt voiced understanding. Patient agreed with treatment plan. Follow up as directed.      Electronically signedby Reuben Felix MD on 6/2/2021

## 2021-06-02 NOTE — RESULT ENCOUNTER NOTE
Noted, may review at planned f/u appt 6/2/21  [facility] message also sent to patient  Thanks

## 2021-07-21 DIAGNOSIS — I10 ESSENTIAL HYPERTENSION: ICD-10-CM

## 2021-08-02 RX ORDER — METOPROLOL SUCCINATE 100 MG/1
TABLET, EXTENDED RELEASE ORAL
Qty: 90 TABLET | Refills: 1 | Status: SHIPPED | OUTPATIENT
Start: 2021-08-02 | End: 2021-12-14 | Stop reason: SDUPTHER

## 2021-09-02 DIAGNOSIS — I10 ESSENTIAL HYPERTENSION: ICD-10-CM

## 2021-09-02 RX ORDER — AMLODIPINE BESYLATE 10 MG/1
TABLET ORAL
Qty: 90 TABLET | Refills: 1 | Status: SHIPPED | OUTPATIENT
Start: 2021-09-02 | End: 2022-03-01 | Stop reason: SDUPTHER

## 2021-09-02 NOTE — TELEPHONE ENCOUNTER
Jim Curiel is requesting a refill on the following medication(s):  Requested Prescriptions     Pending Prescriptions Disp Refills    amLODIPine (NORVASC) 10 MG tablet [Pharmacy Med Name: AMLODIPINE TAB 10MG] 90 tablet 1     Sig: TAKE 1 TABLET NIGHTLY       Last Visit Date (If Applicable):  Visit date not found    Next Visit Date:    Visit date not found

## 2021-10-02 ENCOUNTER — TELEPHONE (OUTPATIENT)
Dept: FAMILY MEDICINE CLINIC | Age: 61
End: 2021-10-02

## 2021-10-02 DIAGNOSIS — I10 ESSENTIAL HYPERTENSION: Primary | ICD-10-CM

## 2021-10-02 DIAGNOSIS — E87.6 HYPOKALEMIA: ICD-10-CM

## 2021-10-02 DIAGNOSIS — Z13.1 SCREENING FOR DIABETES MELLITUS: ICD-10-CM

## 2021-10-02 DIAGNOSIS — E78.2 MIXED HYPERLIPIDEMIA: ICD-10-CM

## 2021-10-02 DIAGNOSIS — R73.01 IMPAIRED FASTING GLUCOSE: ICD-10-CM

## 2021-10-02 NOTE — TELEPHONE ENCOUNTER
Has appt scheduled 11/10/21 with you. Would like to get lab tests done ahead of time. Will you order whatever he needs and let him know when done? Then he will come to [facility] and have done.

## 2021-12-14 DIAGNOSIS — I10 ESSENTIAL HYPERTENSION: ICD-10-CM

## 2021-12-14 NOTE — TELEPHONE ENCOUNTER
Amie Campbell is requesting a refill on the following medication(s):  Requested Prescriptions     Pending Prescriptions Disp Refills    metoprolol succinate (TOPROL XL) 100 MG extended release tablet 90 tablet 0     Sig: TAKE 1 TABLET ONCE DAILY       Last Visit Date (If Applicable):  6/4/0954    Next Visit Date:    2/2/2022

## 2021-12-15 RX ORDER — METOPROLOL SUCCINATE 100 MG/1
TABLET, EXTENDED RELEASE ORAL
Qty: 90 TABLET | Refills: 0 | Status: SHIPPED | OUTPATIENT
Start: 2021-12-15 | End: 2022-03-16 | Stop reason: SDUPTHER

## 2022-03-01 DIAGNOSIS — I10 ESSENTIAL HYPERTENSION: ICD-10-CM

## 2022-03-01 RX ORDER — AMLODIPINE BESYLATE 10 MG/1
TABLET ORAL
Qty: 90 TABLET | Refills: 0 | Status: SHIPPED | OUTPATIENT
Start: 2022-03-01 | End: 2022-03-16 | Stop reason: SDUPTHER

## 2022-03-01 NOTE — TELEPHONE ENCOUNTER
Mallorie Dowd is requesting a refill on the following medication(s):  Requested Prescriptions     Pending Prescriptions Disp Refills    amLODIPine (NORVASC) 10 MG tablet 90 tablet 0     Sig: TAKE 1 TABLET NIGHTLY       Last Visit Date (If Applicable):  2/1/5977    Next Visit Date:    3/16/2022 new patient with Jhon Reyes

## 2022-03-16 ENCOUNTER — OFFICE VISIT (OUTPATIENT)
Dept: FAMILY MEDICINE CLINIC | Age: 62
End: 2022-03-16
Payer: COMMERCIAL

## 2022-03-16 VITALS
OXYGEN SATURATION: 98 % | BODY MASS INDEX: 32.46 KG/M2 | DIASTOLIC BLOOD PRESSURE: 90 MMHG | WEIGHT: 219.8 LBS | HEART RATE: 68 BPM | SYSTOLIC BLOOD PRESSURE: 140 MMHG

## 2022-03-16 DIAGNOSIS — G89.29 CHRONIC RIGHT SHOULDER PAIN: ICD-10-CM

## 2022-03-16 DIAGNOSIS — Z76.89 ENCOUNTER TO ESTABLISH CARE: Primary | ICD-10-CM

## 2022-03-16 DIAGNOSIS — R73.01 IMPAIRED FASTING GLUCOSE: ICD-10-CM

## 2022-03-16 DIAGNOSIS — I10 ESSENTIAL HYPERTENSION: ICD-10-CM

## 2022-03-16 DIAGNOSIS — E78.2 MIXED HYPERLIPIDEMIA: ICD-10-CM

## 2022-03-16 DIAGNOSIS — I34.0 NON-RHEUMATIC MITRAL REGURGITATION: ICD-10-CM

## 2022-03-16 DIAGNOSIS — M25.511 CHRONIC RIGHT SHOULDER PAIN: ICD-10-CM

## 2022-03-16 PROCEDURE — 99214 OFFICE O/P EST MOD 30 MIN: CPT | Performed by: NURSE PRACTITIONER

## 2022-03-16 PROCEDURE — 99213 OFFICE O/P EST LOW 20 MIN: CPT

## 2022-03-16 RX ORDER — AMLODIPINE BESYLATE 10 MG/1
TABLET ORAL
Qty: 90 TABLET | Refills: 3 | Status: SHIPPED | OUTPATIENT
Start: 2022-03-16 | End: 2022-10-17 | Stop reason: SDUPTHER

## 2022-03-16 RX ORDER — LISINOPRIL 5 MG/1
5 TABLET ORAL DAILY
Qty: 30 TABLET | Refills: 1 | Status: SHIPPED | OUTPATIENT
Start: 2022-03-16 | End: 2022-04-13 | Stop reason: SDUPTHER

## 2022-03-16 RX ORDER — METOPROLOL SUCCINATE 100 MG/1
TABLET, EXTENDED RELEASE ORAL
Qty: 90 TABLET | Refills: 3 | Status: SHIPPED | OUTPATIENT
Start: 2022-03-16 | End: 2022-10-17 | Stop reason: SDUPTHER

## 2022-03-16 SDOH — ECONOMIC STABILITY: FOOD INSECURITY: WITHIN THE PAST 12 MONTHS, YOU WORRIED THAT YOUR FOOD WOULD RUN OUT BEFORE YOU GOT MONEY TO BUY MORE.: NEVER TRUE

## 2022-03-16 SDOH — ECONOMIC STABILITY: FOOD INSECURITY: WITHIN THE PAST 12 MONTHS, THE FOOD YOU BOUGHT JUST DIDN'T LAST AND YOU DIDN'T HAVE MONEY TO GET MORE.: NEVER TRUE

## 2022-03-16 ASSESSMENT — ENCOUNTER SYMPTOMS
COUGH: 0
EYES NEGATIVE: 1
ORTHOPNEA: 0
WHEEZING: 0
SHORTNESS OF BREATH: 0
ABDOMINAL PAIN: 0
CONSTIPATION: 0
DIARRHEA: 0

## 2022-03-16 ASSESSMENT — SOCIAL DETERMINANTS OF HEALTH (SDOH): HOW HARD IS IT FOR YOU TO PAY FOR THE VERY BASICS LIKE FOOD, HOUSING, MEDICAL CARE, AND HEATING?: NOT HARD AT ALL

## 2022-03-16 ASSESSMENT — PATIENT HEALTH QUESTIONNAIRE - PHQ9
SUM OF ALL RESPONSES TO PHQ QUESTIONS 1-9: 0
SUM OF ALL RESPONSES TO PHQ QUESTIONS 1-9: 0
SUM OF ALL RESPONSES TO PHQ9 QUESTIONS 1 & 2: 0
1. LITTLE INTEREST OR PLEASURE IN DOING THINGS: 0
SUM OF ALL RESPONSES TO PHQ QUESTIONS 1-9: 0
SUM OF ALL RESPONSES TO PHQ QUESTIONS 1-9: 0
2. FEELING DOWN, DEPRESSED OR HOPELESS: 0

## 2022-03-16 NOTE — PROGRESS NOTES
[address] [facility]  [address]  Dept: [phone]  Dept Fax: [phone]    Patient:  Perla Gordon  YOB: 1960  Date of Service:  3/16/2022    Subjective: Perla Gordon (:  1960) is a 59 y.o. male, New patient, here for evaluation of the following chief complaint(s):    Chief Complaint   Patient presents with   Kendy Peacock Doctor     former pcp dr Sun Victor    Shoulder Pain     c/o right shoulder pain getting worse imaging showed arthritis taking ibuprofen does help some ROM limited trouble sleeping on right side      /95, 175/102, 146/85, 152/99, 167/97, 162/95, 158/94, 136/90. Shoulder Pain   The pain is present in the right shoulder. This is a chronic problem. The current episode started more than 1 year ago. The problem occurs daily. The problem has been gradually worsening. The quality of the pain is described as aching. The pain is at a severity of 7/10. The pain is moderate. Associated symptoms include joint locking (occasional). Pertinent negatives include no fever, numbness or tingling. The symptoms are aggravated by activity. His past medical history is significant for osteoarthritis. Hypertension  This is a chronic problem. The problem has been waxing and waning since onset. The problem is uncontrolled. Pertinent negatives include no anxiety, chest pain, headaches, malaise/fatigue, orthopnea, palpitations, peripheral edema, PND or shortness of breath. There are no associated agents to hypertension. Risk factors for coronary artery disease include male gender, obesity, sedentary lifestyle and dyslipidemia. Past treatments include beta blockers and calcium channel blockers. The current treatment provides mild improvement. Compliance problems include exercise and diet.       Squamous cell carcinoma of the tonsil  Diagnosed , released by ENT and by Radiation Oncology.      The 10-year ASCVD risk score (Meredith Mcqueen et al., 2013) is: 16.8%    Values used to calculate the score:      Age: 59 years      Sex: Male      Is Non- : No      Diabetic: No      Tobacco smoker: No      Systolic Blood Pressure: 169 mmHg      Is BP treated: Yes      HDL Cholesterol: 49 mg/dL      Total Cholesterol: 233 mg/dL     BP Readings from Last 3 Encounters:   03/16/22 (!) 140/90   06/02/21 130/80   03/24/21 (!) 132/92      Pulse Readings from Last 3 Encounters:   03/16/22 68   06/02/21 60   03/24/21 60      Wt Readings from Last 3 Encounters:   03/16/22 219 lb 12.8 oz (99.7 kg)   06/02/21 219 lb (99.3 kg)   03/24/21 218 lb (98.9 kg)        Preventive Care:     Health Maintenance   Topic Date Due    Depression Screen  Never done    HIV screen  Never done    Shingles Vaccine (2 of 3) 01/07/2014    A1C test (Diabetic or Prediabetic)  02/13/2021    Potassium monitoring  05/27/2022    Creatinine monitoring  05/27/2022    Lipid screen  05/27/2026    DTaP/Tdap/Td vaccine (2 - Td or Tdap) 08/12/2029    Colorectal Cancer Screen  09/17/2029    Flu vaccine  Completed    COVID-19 Vaccine  Completed    Hepatitis C screen  Completed    Hepatitis A vaccine  Aged Out    Hepatitis B vaccine  Aged Out    Hib vaccine  Aged Out    Meningococcal (ACWY) vaccine  Aged Out    Pneumococcal 0-64 years Vaccine  Aged Out        Lipid panel:    Lab Results   Component Value Date    CHOL 233 (H) 05/27/2021    TRIG 150 05/27/2021    HDL 49 05/27/2021    LDLCALC 154.0 05/27/2021       Allergies   Allergen Reactions    No Known Allergies        Current Outpatient Medications   Medication Sig Dispense Refill    lisinopril (PRINIVIL;ZESTRIL) 5 MG tablet Take 1 tablet by mouth daily 30 tablet 1    amLODIPine (NORVASC) 10 MG tablet TAKE 1 TABLET NIGHTLY 90 tablet 3    metoprolol succinate (TOPROL XL) 100 MG extended release tablet TAKE 1 TABLET ONCE DAILY 90 tablet 3     No current facility-administered medications for this visit. Past Medical History:   Diagnosis Date    Hyperlipidemia     Hypertension     Rectal polyp 09/2019    Dr. Aaron Laser polyp    SCC (squamous cell carcinoma) 08/2011    R Tonsil       Past Surgical History:   Procedure Laterality Date    COLONOSCOPY  01/2009    Dr Nano Hudson Normal    COLONOSCOPY  09/16/2019    EXCISION OF AURAL MASS  03/2020    TONSILLECTOMY Right 08/10/2011    R Tonsil Dr Luis M Rae       Family History   Problem Relation Age of Onset    Osteoporosis Mother     Dementia Mother     Coronary Art Dis Father     Heart Disease Father     Heart Attack Father     Blindness Paternal Grandfather 76       Social History     Tobacco Use    Smoking status: Never Smoker    Smokeless tobacco: Never Used   Substance Use Topics    Alcohol use: Yes    Drug use: Not Currently       Review of Systems:     Review of Systems   Constitutional: Negative for appetite change, chills, fatigue, fever and malaise/fatigue. HENT: Negative. Eyes: Negative. Respiratory: Negative for cough, shortness of breath and wheezing. Cardiovascular: Negative for chest pain, palpitations, orthopnea, leg swelling and PND. Gastrointestinal: Negative for abdominal pain, constipation and diarrhea. Endocrine: Negative for cold intolerance, heat intolerance, polydipsia, polyphagia and polyuria. Genitourinary: Negative. Musculoskeletal: Positive for arthralgias (right shoulder). Negative for myalgias. Skin: Negative. Allergic/Immunologic: Negative for environmental allergies and food allergies. Neurological: Negative for dizziness, tingling, weakness, light-headedness, numbness and headaches. Psychiatric/Behavioral: Negative for agitation, dysphoric mood, self-injury, sleep disturbance and suicidal ideas. The patient is not nervous/anxious.          PHQ Scores 3/16/2022 2/3/2021 2/13/2020 8/12/2019 10/29/2018 6/29/2017   PHQ2 Score 0 0 0 0 0 0   PHQ9 Score 0 0 0 0 0 0 Interpretation of Total Score Depression Severity: 1-4 = Minimal depression, 5-9 = Mild depression, 10-14 = Moderate depression, 15-19 = Moderately severe depression, 20-27 = Severe depression     Physical Exam:     Vitals:    03/16/22 0837   BP: (!) 140/90   Pulse: 68   SpO2: 98%   Weight: 219 lb 12.8 oz (99.7 kg)     Body mass index is 32.46 kg/m². Physical Exam  Constitutional:       Appearance: Normal appearance. He is well-developed and well-groomed. HENT:      Head: Normocephalic. Eyes:      Conjunctiva/sclera: Conjunctivae normal.      Pupils: Pupils are equal, round, and reactive to light. Neck:      Thyroid: No thyromegaly. Vascular: No carotid bruit. Cardiovascular:      Rate and Rhythm: Normal rate and regular rhythm. Heart sounds: Normal heart sounds. Pulmonary:      Effort: Pulmonary effort is normal.      Breath sounds: Normal breath sounds. No wheezing. Abdominal:      General: Bowel sounds are normal.      Palpations: Abdomen is soft. Tenderness: There is no abdominal tenderness. Musculoskeletal:      Right shoulder: Tenderness (over ac joint) present. Decreased range of motion. Cervical back: Neck supple. Right lower leg: No edema. Left lower leg: No edema. Lymphadenopathy:      Cervical: No cervical adenopathy. Skin:     Capillary Refill: Capillary refill takes less than 2 seconds. Neurological:      Mental Status: He is alert and oriented to person, place, and time. Gait: Gait normal.   Psychiatric:         Mood and Affect: Mood normal.         Behavior: Behavior is cooperative. Assessment/Plan:   1. Encounter to establish care  2. Essential hypertension  -     Hemoglobin A1C; Future  -     Comprehensive Metabolic Panel; Future  -     Lipid Panel; Future  -     CBC with Auto Differential; Future  -     lisinopril (PRINIVIL;ZESTRIL) 5 MG tablet;  Take 1 tablet by mouth daily, Disp-30 tablet, R-1Normal  -     amLODIPine (NORVASC) 10 MG tablet; TAKE 1 TABLET NIGHTLY, Disp-90 tablet, R-3Normal  -     metoprolol succinate (TOPROL XL) 100 MG extended release tablet; TAKE 1 TABLET ONCE DAILY, Disp-90 tablet, R-3In place of 50 mg doseNormal  3. Mixed hyperlipidemia  -     Hemoglobin A1C; Future  -     Comprehensive Metabolic Panel; Future  -     Lipid Panel; Future  -     CBC with Auto Differential; Future  4. Impaired fasting glucose  -     Hemoglobin A1C; Future  -     Comprehensive Metabolic Panel; Future  -     Lipid Panel; Future  -     CBC with Auto Differential; Future  5. Chronic right shoulder pain  -     [facility] - [place]  6. Non-rheumatic mitral regurgitation     Start Lisinopril 5 mg daily for better control of blood pressure. Instructed to increase water and decrease salt in diet. Encouraged healthy diet and routine exercise. Instructed to continue current medications. All patient questions answered. Patient voiced understanding. Patient agreed with treatment plan. Return in about 4 weeks (around 4/13/2022) for HTN. Please note that this chart was generated using voice recognition Dragon dictation software. Although every effort was made to ensure the accuracy of this automated transcription, some errors in transcription may have occurred.     Electronically signed by SAURAV Caban CNP on 3/16/2022 at 9:19 AM.

## 2022-04-11 LAB
ALBUMIN/GLOBULIN RATIO: 1.6 G/DL
ALBUMIN: 4.9 G/DL (ref 3.5–5)
ALP BLD-CCNC: 83 UNITS/L (ref 38–126)
ALT SERPL-CCNC: 61 UNITS/L (ref 4–50)
ANION GAP SERPL CALCULATED.3IONS-SCNC: 9.5 MMOL/L
AST SERPL-CCNC: 43 UNITS/L (ref 17–59)
BASOPHILS %: 1.29 (ref 0–3)
BASOPHILS ABSOLUTE: 0.08 (ref 0–0.3)
BILIRUB SERPL-MCNC: 0.9 MG/DL (ref 0.2–1.3)
BUN BLDV-MCNC: 12 MG/DL (ref 9–20)
CALCIUM SERPL-MCNC: 9.2 MG/DL (ref 8.4–10.2)
CHLORIDE BLD-SCNC: 102 MMOL/L (ref 98–120)
CHOLESTEROL/HDL RATIO: 4.59 RATIO (ref 0–4.5)
CHOLESTEROL: 234 MG/DL (ref 50–200)
CO2: 29 MMOL/L (ref 22–31)
CREAT SERPL-MCNC: 0.8 MG/DL (ref 0.7–1.3)
EOSINOPHILS %: 3.04 (ref 0–10)
EOSINOPHILS ABSOLUTE: 0.18 (ref 0–1.1)
GFR CALCULATED: > 60
GLOBULIN: 3 G/DL
GLUCOSE: 109 MG/DL (ref 75–110)
HBA1C MFR BLD: 6.1 % (ref 4.4–6.4)
HCT VFR BLD CALC: 52.9 % (ref 42–52)
HDLC SERPL-MCNC: 51 MG/DL (ref 36–68)
HEMOGLOBIN: 18 (ref 13.8–17.8)
LDL CHOLESTEROL CALCULATED: 155.8 MG/DL (ref 0–160)
LYMPHOCYTE %: 27.27 (ref 20–51.1)
LYMPHOCYTES ABSOLUTE: 1.65 (ref 1–5.5)
MCH RBC QN AUTO: 31.8 PG (ref 28.5–32.5)
MCHC RBC AUTO-ENTMCNC: 34 G/DL (ref 32–37)
MCV RBC AUTO: 93.5 FL (ref 80–94)
MONOCYTES %: 8.12 (ref 1.7–9.3)
MONOCYTES ABSOLUTE: 0.49 (ref 0.1–1)
NEUTROPHILS %: 60.28 (ref 42.2–75.2)
NEUTROPHILS ABSOLUTE: 3.64 (ref 2–8.1)
PDW BLD-RTO: 12.6 % (ref 10–15.5)
PLATELET # BLD: 264.1 THOU/MM3 (ref 130–400)
POTASSIUM SERPL-SCNC: 3.9 MMOL/L (ref 3.6–5)
RBC: 5.65 M/UL (ref 4.7–6.1)
SODIUM BLD-SCNC: 141 MMOL/L (ref 135–145)
TOTAL PROTEIN, SERUM: 7.9 G/DL (ref 6.3–8.2)
TRIGL SERPL-MCNC: 136 MG/DL (ref 10–250)
VLDLC SERPL CALC-MCNC: 27 MG/DL (ref 0–50)
WBC: 6 THOU/ML3 (ref 4.8–10.8)

## 2022-04-13 ENCOUNTER — OFFICE VISIT (OUTPATIENT)
Dept: FAMILY MEDICINE CLINIC | Age: 62
End: 2022-04-13
Payer: COMMERCIAL

## 2022-04-13 VITALS — OXYGEN SATURATION: 98 % | DIASTOLIC BLOOD PRESSURE: 98 MMHG | HEART RATE: 61 BPM | SYSTOLIC BLOOD PRESSURE: 144 MMHG

## 2022-04-13 DIAGNOSIS — I10 ESSENTIAL HYPERTENSION: Primary | ICD-10-CM

## 2022-04-13 DIAGNOSIS — M25.511 CHRONIC RIGHT SHOULDER PAIN: ICD-10-CM

## 2022-04-13 DIAGNOSIS — R73.01 IMPAIRED FASTING GLUCOSE: ICD-10-CM

## 2022-04-13 DIAGNOSIS — G89.29 CHRONIC RIGHT SHOULDER PAIN: ICD-10-CM

## 2022-04-13 DIAGNOSIS — I34.0 NON-RHEUMATIC MITRAL REGURGITATION: ICD-10-CM

## 2022-04-13 DIAGNOSIS — E78.2 MIXED HYPERLIPIDEMIA: ICD-10-CM

## 2022-04-13 PROCEDURE — 99213 OFFICE O/P EST LOW 20 MIN: CPT | Performed by: NURSE PRACTITIONER

## 2022-04-13 RX ORDER — LISINOPRIL 10 MG/1
10 TABLET ORAL DAILY
Qty: 90 TABLET | Refills: 2 | Status: SHIPPED | OUTPATIENT
Start: 2022-04-13 | End: 2022-10-17 | Stop reason: SDUPTHER

## 2022-04-13 NOTE — PROGRESS NOTES
[address] [address] [address]  Dept: [phone]  Dept Fax: [phone]    History and Physical  Patient:  Camille Jacob  YOB: 1960  Date of Service:  2022    Subjective: Camille Jacob (:  1960) is a 59 y.o. male, Established patient, here for evaluation of the following chief complaint(s):    Chief Complaint   Patient presents with    Hypertension     f/u has been monitoring at home generally in the range of 138/84 appears to be lower in morning than in evening       Hypertension  This is a chronic problem. The problem has been waxing and waning since onset. The problem is uncontrolled. Pertinent negatives include no anxiety, chest pain, headaches, malaise/fatigue, orthopnea, palpitations, peripheral edema, PND or shortness of breath. There are no associated agents to hypertension. Risk factors for coronary artery disease include male gender, obesity, sedentary lifestyle and dyslipidemia. Past treatments include beta blockers, ACE, and calcium channel blockers. The current treatment provides mild improvement.  Compliance problems include exercise and diet.       Squamous cell carcinoma of the tonsil  Diagnosed , released by ENT and by Radiation Oncology.      The 10-year ASCVD risk score (Darby Andres., et al., 2013) is: 17.2%    Values used to calculate the score:      Age: 59 years      Sex: Male      Is Non- : No      Diabetic: No      Tobacco smoker: No      Systolic Blood Pressure: 934 mmHg      Is BP treated: Yes      HDL Cholesterol: 51 mg/dL      Total Cholesterol: 234 mg/dL     BP Readings from Last 3 Encounters:   22 (!) 144/98   22 (!) 140/90   21 130/80      Pulse Readings from Last 3 Encounters:   22 61   22 68   21 60      Wt Readings from Last 3 Encounters:   22 219 lb 12.8 oz (99.7 kg)   21 219 lb (99.3 kg)   21 218 lb (98.9 kg)        Allergies   Allergen Reactions    No Known Allergies        Current Outpatient Medications   Medication Sig Dispense Refill    lisinopril (PRINIVIL;ZESTRIL) 10 MG tablet Take 1 tablet by mouth daily 90 tablet 2    amLODIPine (NORVASC) 10 MG tablet TAKE 1 TABLET NIGHTLY 90 tablet 3    metoprolol succinate (TOPROL XL) 100 MG extended release tablet TAKE 1 TABLET ONCE DAILY 90 tablet 3     No current facility-administered medications for this visit. Past Medical History:   Diagnosis Date    Hyperlipidemia     Hypertension     Rectal polyp 09/2019    Dr. Yuli Whalen polyp    SCC (squamous cell carcinoma) 08/2011    R Tonsil       Past Surgical History:   Procedure Laterality Date    COLONOSCOPY  01/2009    Dr Nury Beard Normal    COLONOSCOPY  09/16/2019    EXCISION OF AURAL MASS  03/2020    TONSILLECTOMY Right 08/10/2011    R Tonsil Dr Radha Forrester     Family History   Problem Relation Age of Onset    Osteoporosis Mother     Dementia Mother     Coronary Art Dis Father     Heart Disease Father     Heart Attack Father     Blindness Paternal Grandfather 76     Social History     Tobacco Use    Smoking status: Never Smoker    Smokeless tobacco: Never Used   Substance Use Topics    Alcohol use: Yes    Drug use: Not Currently       Review of Systems:     Review of Systems   Constitutional: Negative for appetite change, chills, fatigue and fever. HENT: Negative. Eyes: Negative. Respiratory: Negative for cough, shortness of breath and wheezing. Cardiovascular: Negative for chest pain, palpitations and leg swelling. Gastrointestinal: Negative for abdominal pain, constipation and diarrhea. Endocrine: Negative for cold intolerance, heat intolerance, polydipsia, polyphagia and polyuria. Genitourinary: Negative. Musculoskeletal: Positive for arthralgias (right shoulder). Negative for myalgias.    Allergic/Immunologic: Negative for environmental allergies and food allergies. Neurological: Negative for dizziness, weakness, light-headedness and headaches. Psychiatric/Behavioral: Negative for agitation, dysphoric mood, self-injury, sleep disturbance and suicidal ideas. The patient is not nervous/anxious. PHQ Scores 3/16/2022 2/3/2021 2/13/2020 8/12/2019 10/29/2018 6/29/2017   PHQ2 Score 0 0 0 0 0 0   PHQ9 Score 0 0 0 0 0 0     Interpretation of Total Score Depression Severity: 1-4 = Minimal depression, 5-9 = Mild depression, 10-14 = Moderate depression, 15-19 = Moderately severe depression, 20-27 = Severe depression     Physical Exam:     Vitals:    04/13/22 0828 04/13/22 0904   BP: (!) 144/86 (!) 144/98   Pulse: 61    SpO2: 98%       There is no height or weight on file to calculate BMI. Physical Exam  Constitutional:       Appearance: Normal appearance. He is well-developed and well-groomed. HENT:      Head: Normocephalic. Eyes:      Conjunctiva/sclera: Conjunctivae normal.      Pupils: Pupils are equal, round, and reactive to light. Neck:      Thyroid: No thyromegaly. Vascular: No carotid bruit. Cardiovascular:      Rate and Rhythm: Normal rate and regular rhythm. Heart sounds: Normal heart sounds. Pulmonary:      Effort: Pulmonary effort is normal.      Breath sounds: Normal breath sounds. No wheezing. Abdominal:      General: Bowel sounds are normal.      Palpations: Abdomen is soft. Tenderness: There is no abdominal tenderness. Musculoskeletal:      Cervical back: Neck supple. Right lower leg: No edema. Left lower leg: No edema. Lymphadenopathy:      Cervical: No cervical adenopathy. Skin:     Capillary Refill: Capillary refill takes less than 2 seconds. Neurological:      Mental Status: He is alert and oriented to person, place, and time. Gait: Gait normal.   Psychiatric:         Mood and Affect: Mood normal.         Behavior: Behavior is cooperative. Assessment/Plan:   1.  Essential hypertension  - lisinopril (PRINIVIL;ZESTRIL) 10 MG tablet; Take 1 tablet by mouth daily, Disp-90 tablet, R-2Normal  2. Impaired fasting glucose  3. Mixed hyperlipidemia  4. Non-rheumatic mitral regurgitation  5. Chronic right shoulder pain      Increase lisinopril to 10 mg daily. Continue to monitor blood pressure at home. Will need repeat on labs in 3 months, consider statin and metformin. All patient questions answered. Patient voiced understanding. Instructed to continue current medications. Patient agreed with treatment plan. Follow up as directed. Return in about 3 months (around 7/13/2022). Please note that this chart was generated using voice recognition Dragon dictation software. Although every effort was made to ensure the accuracy of this automated transcription, some errors in transcription may have occurred.     Electronically signed by SAURAV Valle CNP on 4/23/2022

## 2022-04-14 DIAGNOSIS — I10 ESSENTIAL HYPERTENSION: ICD-10-CM

## 2022-04-14 DIAGNOSIS — E78.2 MIXED HYPERLIPIDEMIA: ICD-10-CM

## 2022-04-14 DIAGNOSIS — R73.01 IMPAIRED FASTING GLUCOSE: ICD-10-CM

## 2022-04-23 ASSESSMENT — ENCOUNTER SYMPTOMS
EYES NEGATIVE: 1
DIARRHEA: 0
ABDOMINAL PAIN: 0
SHORTNESS OF BREATH: 0
COUGH: 0
CONSTIPATION: 0
WHEEZING: 0

## 2022-10-17 ENCOUNTER — TELEPHONE (OUTPATIENT)
Dept: FAMILY MEDICINE CLINIC | Age: 62
End: 2022-10-17

## 2022-10-17 DIAGNOSIS — I10 ESSENTIAL HYPERTENSION: ICD-10-CM

## 2022-10-17 RX ORDER — AMLODIPINE BESYLATE 10 MG/1
TABLET ORAL
Qty: 90 TABLET | Refills: 3 | Status: SHIPPED | OUTPATIENT
Start: 2022-10-17

## 2022-10-17 RX ORDER — LISINOPRIL 10 MG/1
10 TABLET ORAL DAILY
Qty: 90 TABLET | Refills: 2 | Status: SHIPPED | OUTPATIENT
Start: 2022-10-17

## 2022-10-17 RX ORDER — METOPROLOL SUCCINATE 100 MG/1
TABLET, EXTENDED RELEASE ORAL
Qty: 90 TABLET | Refills: 3 | Status: SHIPPED | OUTPATIENT
Start: 2022-10-17

## 2022-10-17 NOTE — TELEPHONE ENCOUNTER
Needs new scripts for 90 days sent to [facility]. No longer uses the mail order pharmacy they had been sent to. Switched insurances.

## 2023-06-29 DIAGNOSIS — I10 ESSENTIAL HYPERTENSION: ICD-10-CM

## 2023-06-29 RX ORDER — LISINOPRIL 10 MG/1
TABLET ORAL
Qty: 30 TABLET | Refills: 1 | Status: SHIPPED | OUTPATIENT
Start: 2023-06-29

## 2023-07-08 SDOH — ECONOMIC STABILITY: HOUSING INSECURITY
IN THE LAST 12 MONTHS, WAS THERE A TIME WHEN YOU DID NOT HAVE A STEADY PLACE TO SLEEP OR SLEPT IN A SHELTER (INCLUDING NOW)?: NO

## 2023-07-08 SDOH — ECONOMIC STABILITY: FOOD INSECURITY: WITHIN THE PAST 12 MONTHS, THE FOOD YOU BOUGHT JUST DIDN'T LAST AND YOU DIDN'T HAVE MONEY TO GET MORE.: NEVER TRUE

## 2023-07-08 SDOH — ECONOMIC STABILITY: INCOME INSECURITY: HOW HARD IS IT FOR YOU TO PAY FOR THE VERY BASICS LIKE FOOD, HOUSING, MEDICAL CARE, AND HEATING?: NOT HARD AT ALL

## 2023-07-08 SDOH — ECONOMIC STABILITY: TRANSPORTATION INSECURITY
IN THE PAST 12 MONTHS, HAS LACK OF TRANSPORTATION KEPT YOU FROM MEETINGS, WORK, OR FROM GETTING THINGS NEEDED FOR DAILY LIVING?: NO

## 2023-07-08 SDOH — ECONOMIC STABILITY: FOOD INSECURITY: WITHIN THE PAST 12 MONTHS, YOU WORRIED THAT YOUR FOOD WOULD RUN OUT BEFORE YOU GOT MONEY TO BUY MORE.: NEVER TRUE

## 2023-07-08 ASSESSMENT — PATIENT HEALTH QUESTIONNAIRE - PHQ9
1. LITTLE INTEREST OR PLEASURE IN DOING THINGS: 0
1. LITTLE INTEREST OR PLEASURE IN DOING THINGS: NOT AT ALL
SUM OF ALL RESPONSES TO PHQ9 QUESTIONS 1 & 2: 0
SUM OF ALL RESPONSES TO PHQ QUESTIONS 1-9: 0
2. FEELING DOWN, DEPRESSED OR HOPELESS: NOT AT ALL
SUM OF ALL RESPONSES TO PHQ QUESTIONS 1-9: 0
SUM OF ALL RESPONSES TO PHQ9 QUESTIONS 1 & 2: 0
2. FEELING DOWN, DEPRESSED OR HOPELESS: 0

## 2023-07-11 ENCOUNTER — OFFICE VISIT (OUTPATIENT)
Dept: FAMILY MEDICINE CLINIC | Age: 63
End: 2023-07-11
Payer: COMMERCIAL

## 2023-07-11 VITALS
SYSTOLIC BLOOD PRESSURE: 140 MMHG | HEART RATE: 57 BPM | OXYGEN SATURATION: 98 % | HEIGHT: 69 IN | BODY MASS INDEX: 31.84 KG/M2 | DIASTOLIC BLOOD PRESSURE: 98 MMHG | WEIGHT: 215 LBS

## 2023-07-11 DIAGNOSIS — R73.01 IMPAIRED FASTING GLUCOSE: ICD-10-CM

## 2023-07-11 DIAGNOSIS — E78.2 MIXED HYPERLIPIDEMIA: ICD-10-CM

## 2023-07-11 DIAGNOSIS — D75.1 POLYCYTHEMIA: ICD-10-CM

## 2023-07-11 DIAGNOSIS — I34.0 NON-RHEUMATIC MITRAL REGURGITATION: ICD-10-CM

## 2023-07-11 DIAGNOSIS — I10 ESSENTIAL HYPERTENSION: ICD-10-CM

## 2023-07-11 DIAGNOSIS — Z00.00 ENCOUNTER FOR WELL ADULT EXAM WITHOUT ABNORMAL FINDINGS: Primary | ICD-10-CM

## 2023-07-11 PROCEDURE — 99396 PREV VISIT EST AGE 40-64: CPT | Performed by: NURSE PRACTITIONER

## 2023-07-11 PROCEDURE — 3074F SYST BP LT 130 MM HG: CPT | Performed by: NURSE PRACTITIONER

## 2023-07-11 PROCEDURE — 3078F DIAST BP <80 MM HG: CPT | Performed by: NURSE PRACTITIONER

## 2023-07-11 RX ORDER — LISINOPRIL 10 MG/1
10 TABLET ORAL DAILY
Qty: 90 TABLET | Refills: 3 | Status: SHIPPED | OUTPATIENT
Start: 2023-07-11

## 2023-07-19 PROBLEM — D75.1 POLYCYTHEMIA: Status: ACTIVE | Noted: 2023-07-19

## 2023-07-19 ASSESSMENT — ENCOUNTER SYMPTOMS
WHEEZING: 0
SHORTNESS OF BREATH: 0
NAUSEA: 0
ABDOMINAL PAIN: 0
CONSTIPATION: 0
EYES NEGATIVE: 1
COUGH: 0
DIARRHEA: 0

## 2023-07-19 NOTE — ASSESSMENT & PLAN NOTE
Unclear control, continue current medications and lifestyle modifications recommended. Lipid ordered.

## 2023-07-19 NOTE — ASSESSMENT & PLAN NOTE
Well-controlled, continue current medications   Hemoglobin A1C   Date Value Ref Range Status   04/11/2022 6.1 4.4 - 6.4 % Final

## 2023-08-16 DIAGNOSIS — I10 ESSENTIAL HYPERTENSION: ICD-10-CM

## 2023-08-17 RX ORDER — METOPROLOL SUCCINATE 100 MG/1
TABLET, EXTENDED RELEASE ORAL
Qty: 90 TABLET | Refills: 3 | Status: SHIPPED | OUTPATIENT
Start: 2023-08-17

## 2023-08-17 RX ORDER — AMLODIPINE BESYLATE 10 MG/1
TABLET ORAL
Qty: 90 TABLET | Refills: 3 | Status: SHIPPED | OUTPATIENT
Start: 2023-08-17

## 2023-08-17 NOTE — TELEPHONE ENCOUNTER
Avis Apodaca is calling to request a refill on the following medication(s):  Requested Prescriptions     Pending Prescriptions Disp Refills    metoprolol succinate (TOPROL XL) 100 MG extended release tablet 90 tablet 3     Sig: TAKE 1 TABLET ONCE DAILY    amLODIPine (NORVASC) 10 MG tablet 90 tablet 3     Sig: TAKE 1 TABLET NIGHTLY       Last Visit Date (If Applicable):  2/28/1062    Next Visit Date:    Visit date not found

## 2023-12-04 LAB
ALBUMIN/GLOBULIN RATIO: 1.4 G/DL
ALBUMIN: 4.5 G/DL (ref 3.5–5)
ALP BLD-CCNC: 75 UNITS/L (ref 38–126)
ALT SERPL-CCNC: 52 UNITS/L (ref 4–50)
ANION GAP SERPL CALCULATED.3IONS-SCNC: 10.3 MMOL/L (ref 12–16)
AST SERPL-CCNC: 40 UNITS/L (ref 17–59)
BASOPHILS %: 1.29 (ref 0–3)
BASOPHILS ABSOLUTE: 0.08 (ref 0–0.3)
BILIRUB SERPL-MCNC: 0.9 MG/DL (ref 0.2–1.3)
BUN BLDV-MCNC: 11 MG/DL (ref 9–20)
CALCIUM SERPL-MCNC: 8.9 MG/DL (ref 8.4–10.2)
CHLORIDE BLD-SCNC: 103 MMOL/L (ref 98–120)
CHOLESTEROL/HDL RATIO: 5.6 RATIO (ref 0–4.5)
CHOLESTEROL: 241 MG/DL (ref 50–200)
CO2: 28 MMOL/L (ref 22–31)
CREAT SERPL-MCNC: 0.7 MG/DL (ref 0.7–1.3)
CREATININE, RANDOM URINE: 253.4 MG/DL (ref 20–370)
EOSINOPHILS %: 3.79 (ref 0–10)
EOSINOPHILS ABSOLUTE: 0.24 (ref 0–1.1)
GFR CALCULATED: > 60
GLOBULIN: 3.1 G/DL
GLUCOSE: 137 MG/DL (ref 75–110)
HBA1C MFR BLD: 5.7 % (ref 4.4–6.4)
HCT VFR BLD CALC: 51.9 % (ref 42–52)
HDLC SERPL-MCNC: 43 MG/DL (ref 36–68)
HEMOGLOBIN: 16.9 (ref 13.8–17.8)
LDL CHOLESTEROL CALCULATED: 163.2 MG/DL (ref 0–160)
LYMPHOCYTE %: 24.36 (ref 20–51.1)
LYMPHOCYTES ABSOLUTE: 1.57 (ref 1–5.5)
MCH RBC QN AUTO: 30.2 PG (ref 28.5–32.5)
MCHC RBC AUTO-ENTMCNC: 32.5 G/DL (ref 32–37)
MCV RBC AUTO: 93 FL (ref 80–94)
MICROALBUMIN UR-MCNC: 29.8 MG/DL (ref 0–1.7)
MICROALBUMIN/CREAT UR-RTO: 117.6
MONOCYTES %: 8.65 (ref 1.7–9.3)
MONOCYTES ABSOLUTE: 0.56 (ref 0.1–1)
NEUTROPHILS %: 61.91 (ref 42.2–75.2)
NEUTROPHILS ABSOLUTE: 3.98 (ref 2–8.1)
PDW BLD-RTO: 12.3 % (ref 10–15.5)
PLATELET # BLD: 278.2 THOU/MM3 (ref 130–400)
POTASSIUM SERPL-SCNC: 3.6 MMOL/L (ref 3.6–5)
RBC: 5.58 M/UL (ref 4.7–6.1)
SODIUM BLD-SCNC: 141 MMOL/L (ref 135–145)
TOTAL PROTEIN, SERUM: 7.6 G/DL (ref 6.3–8.2)
TRIGL SERPL-MCNC: 174 MG/DL (ref 10–250)
VLDLC SERPL CALC-MCNC: 35 MG/DL (ref 0–50)
WBC: 6.4 THOU/ML3 (ref 4.8–10.8)

## 2024-07-03 DIAGNOSIS — I10 ESSENTIAL HYPERTENSION: ICD-10-CM

## 2024-07-03 DIAGNOSIS — R73.01 IMPAIRED FASTING GLUCOSE: ICD-10-CM

## 2024-07-03 NOTE — TELEPHONE ENCOUNTER
Jurgen Shah is calling to request a refill on the following medication(s):  Requested Prescriptions     Pending Prescriptions Disp Refills    lisinopril (PRINIVIL;ZESTRIL) 10 MG tablet [Pharmacy Med Name: Lisinopril 10 MG Oral Tablet] 90 tablet 3     Sig: Take 1 tablet by mouth once daily       Last Visit Date (If Applicable):  7/11/2023    Next Visit Date:    7/10/2024

## 2024-07-05 RX ORDER — LISINOPRIL 10 MG/1
10 TABLET ORAL DAILY
Qty: 90 TABLET | Refills: 3 | Status: SHIPPED | OUTPATIENT
Start: 2024-07-05

## 2024-07-14 SDOH — ECONOMIC STABILITY: FOOD INSECURITY: WITHIN THE PAST 12 MONTHS, THE FOOD YOU BOUGHT JUST DIDN'T LAST AND YOU DIDN'T HAVE MONEY TO GET MORE.: NEVER TRUE

## 2024-07-14 SDOH — ECONOMIC STABILITY: FOOD INSECURITY: WITHIN THE PAST 12 MONTHS, YOU WORRIED THAT YOUR FOOD WOULD RUN OUT BEFORE YOU GOT MONEY TO BUY MORE.: NEVER TRUE

## 2024-07-14 SDOH — ECONOMIC STABILITY: INCOME INSECURITY: HOW HARD IS IT FOR YOU TO PAY FOR THE VERY BASICS LIKE FOOD, HOUSING, MEDICAL CARE, AND HEATING?: NOT HARD AT ALL

## 2024-07-14 SDOH — HEALTH STABILITY: PHYSICAL HEALTH: ON AVERAGE, HOW MANY DAYS PER WEEK DO YOU ENGAGE IN MODERATE TO STRENUOUS EXERCISE (LIKE A BRISK WALK)?: 2 DAYS

## 2024-07-14 SDOH — HEALTH STABILITY: PHYSICAL HEALTH: ON AVERAGE, HOW MANY MINUTES DO YOU ENGAGE IN EXERCISE AT THIS LEVEL?: 20 MIN

## 2024-07-14 ASSESSMENT — LIFESTYLE VARIABLES
HOW OFTEN DO YOU HAVE A DRINK CONTAINING ALCOHOL: 2-4 TIMES A MONTH
HOW MANY STANDARD DRINKS CONTAINING ALCOHOL DO YOU HAVE ON A TYPICAL DAY: 1
HOW OFTEN DO YOU HAVE A DRINK CONTAINING ALCOHOL: 3
HOW MANY STANDARD DRINKS CONTAINING ALCOHOL DO YOU HAVE ON A TYPICAL DAY: 1 OR 2
HOW OFTEN DO YOU HAVE SIX OR MORE DRINKS ON ONE OCCASION: 1

## 2024-07-14 ASSESSMENT — PATIENT HEALTH QUESTIONNAIRE - PHQ9
SUM OF ALL RESPONSES TO PHQ QUESTIONS 1-9: 0
SUM OF ALL RESPONSES TO PHQ9 QUESTIONS 1 & 2: 0
SUM OF ALL RESPONSES TO PHQ QUESTIONS 1-9: 0
SUM OF ALL RESPONSES TO PHQ QUESTIONS 1-9: 0
2. FEELING DOWN, DEPRESSED OR HOPELESS: NOT AT ALL
SUM OF ALL RESPONSES TO PHQ QUESTIONS 1-9: 0
1. LITTLE INTEREST OR PLEASURE IN DOING THINGS: NOT AT ALL

## 2024-07-17 ENCOUNTER — OFFICE VISIT (OUTPATIENT)
Dept: FAMILY MEDICINE CLINIC | Age: 64
End: 2024-07-17
Payer: MEDICARE

## 2024-07-17 VITALS
SYSTOLIC BLOOD PRESSURE: 128 MMHG | OXYGEN SATURATION: 97 % | HEART RATE: 67 BPM | DIASTOLIC BLOOD PRESSURE: 72 MMHG | WEIGHT: 212.8 LBS | BODY MASS INDEX: 31.52 KG/M2

## 2024-07-17 DIAGNOSIS — R80.9 PROTEINURIA, UNSPECIFIED TYPE: ICD-10-CM

## 2024-07-17 DIAGNOSIS — R35.0 INCREASED URINARY FREQUENCY: ICD-10-CM

## 2024-07-17 DIAGNOSIS — I10 ESSENTIAL HYPERTENSION: ICD-10-CM

## 2024-07-17 DIAGNOSIS — R73.01 IMPAIRED FASTING GLUCOSE: ICD-10-CM

## 2024-07-17 DIAGNOSIS — Z00.00 WELCOME TO MEDICARE PREVENTIVE VISIT: Primary | ICD-10-CM

## 2024-07-17 DIAGNOSIS — D75.1 POLYCYTHEMIA: ICD-10-CM

## 2024-07-17 DIAGNOSIS — E78.2 MIXED HYPERLIPIDEMIA: ICD-10-CM

## 2024-07-17 DIAGNOSIS — I34.0 NON-RHEUMATIC MITRAL REGURGITATION: ICD-10-CM

## 2024-07-17 LAB — HBA1C MFR BLD: 6.2 %

## 2024-07-17 PROCEDURE — PBSHW POCT GLYCOSYLATED HEMOGLOBIN (HGB A1C): Performed by: NURSE PRACTITIONER

## 2024-07-17 PROCEDURE — 1123F ACP DISCUSS/DSCN MKR DOCD: CPT | Performed by: NURSE PRACTITIONER

## 2024-07-17 PROCEDURE — 3078F DIAST BP <80 MM HG: CPT | Performed by: NURSE PRACTITIONER

## 2024-07-17 PROCEDURE — 3074F SYST BP LT 130 MM HG: CPT | Performed by: NURSE PRACTITIONER

## 2024-07-17 PROCEDURE — 83036 HEMOGLOBIN GLYCOSYLATED A1C: CPT | Performed by: NURSE PRACTITIONER

## 2024-07-17 PROCEDURE — G0438 PPPS, INITIAL VISIT: HCPCS | Performed by: NURSE PRACTITIONER

## 2024-07-17 NOTE — PATIENT INSTRUCTIONS
Learning About Being Active as an Older Adult  Why is being active important as you get older?     Being active is one of the best things you can do for your health. And it's never too late to start. Being active--or getting active, if you aren't already--has definite benefits. It can:  Give you more energy,  Keep your mind sharp.  Improve balance to reduce your risk of falls.  Help you manage chronic illness with fewer medicines.  No matter how old you are, how fit you are, or what health problems you have, there is a form of activity that will work for you. And the more physical activity you can do, the better your overall health will be.  What kinds of activity can help you stay healthy?  Being more active will make your daily activities easier. Physical activity includes planned exercise and things you do in daily life. There are four types of activity:  Aerobic.  Doing aerobic activity makes your heart and lungs strong.  Includes walking, dancing, and gardening.  Aim for at least 2½ hours spread throughout the week.  It improves your energy and can help you sleep better.  Muscle-strengthening.  This type of activity can help maintain muscle and strengthen bones.  Includes climbing stairs, using resistance bands, and lifting or carrying heavy loads.  Aim for at least twice a week.  It can help protect the knees and other joints.  Stretching.  Stretching gives you better range of motion in joints and muscles.  Includes upper arm stretches, calf stretches, and gentle yoga.  Aim for at least twice a week, preferably after your muscles are warmed up from other activities.  It can help you function better in daily life.  Balancing.  This helps you stay coordinated and have good posture.  Includes heel-to-toe walking, giovanny chi, and certain types of yoga.  Aim for at least 3 days a week.  It can reduce your risk of falling.  Even if you have a hard time meeting the recommendations, it's better to be more active  Stage 3: Additional Anesthesia Type: 1% lidocaine with epinephrine

## 2024-07-17 NOTE — PROGRESS NOTES
doing well overall but mentions increased urinary frequency. The patient is concerned about his blood pressure, which he typically measures at home, with readings usually ranging from 144 to 152 mmHg. He did not bring his blood pressure log today. He is currently on three antihypertensive medications. He is not on any cholesterol medication. The patient walks his dog daily and is more active during the warmer months. He has upcoming travel plans to the [facility] and [place]. He reports some difficulty hearing in noisy environments and plans to schedule an eye exam as it has been two years since his last one. He also mentions occasional difficulty reading small print. The patient has no significant issues with safety at home but acknowledges having throw rugs. He denies any pain, burning, or difficulty with urination. There is no family history of prostate cancer, and he has not had a PSA screening.      Patient's complete Health Risk Assessment and screening values have been reviewed and are found in Flowsheets. The following problems were reviewed today and where indicated follow up appointments were made and/or referrals ordered.    Positive Risk Factor Screenings with Interventions:                Inactivity:  On average, how many days per week do you engage in moderate to strenuous exercise (like a brisk walk)?: 2 days (!) Abnormal  On average, how many minutes do you engage in exercise at this level?: 20 min  Interventions - Inactivity:  Patient comments: walks the dog, yard work  See AVS for additional education material     Abnormal BMI (obese):  Body mass index is 31.52 kg/m². (!) Abnormal  Interventions:  See AVS for additional education material       Hearing Screen:  Do you or your family notice any trouble with your hearing that hasn't been managed with hearing aids?: (!) Yes    Interventions:  Patient declines any further evaluation or treatment    Vision Screen:  Do you have difficulty driving,

## 2024-08-28 DIAGNOSIS — I10 ESSENTIAL HYPERTENSION: ICD-10-CM

## 2024-08-28 RX ORDER — METOPROLOL SUCCINATE 100 MG/1
TABLET, EXTENDED RELEASE ORAL
Qty: 90 TABLET | Refills: 3 | Status: SHIPPED | OUTPATIENT
Start: 2024-08-28

## 2024-08-28 RX ORDER — AMLODIPINE BESYLATE 10 MG/1
TABLET ORAL
Qty: 90 TABLET | Refills: 3 | Status: SHIPPED | OUTPATIENT
Start: 2024-08-28

## 2024-08-28 NOTE — TELEPHONE ENCOUNTER
Jurgen Shah is calling to request a refill on the following medication(s):  Requested Prescriptions     Pending Prescriptions Disp Refills    metoprolol succinate (TOPROL XL) 100 MG extended release tablet [Pharmacy Med Name: Metoprolol Succinate  MG Oral Tablet Extended Release 24 Hour] 90 tablet 3     Sig: Take 1 tablet by mouth once daily    amLODIPine (NORVASC) 10 MG tablet [Pharmacy Med Name: amLODIPine Besylate 10 MG Oral Tablet] 90 tablet 3     Sig: Take 1 tablet by mouth nightly       Last Visit Date (If Applicable):  7/17/2024    Next Visit Date:    Visit date not found

## 2025-06-23 LAB
ALBUMIN/GLOBULIN RATIO: 1.6 G/DL
ALBUMIN: 4.5 G/DL (ref 3.5–5)
ALP BLD-CCNC: 70 UNITS/L (ref 38–126)
ALT SERPL-CCNC: 33 UNITS/L (ref 4–50)
ANION GAP SERPL CALCULATED.3IONS-SCNC: 11.2 MMOL/L (ref 3–11)
AST SERPL-CCNC: 29 UNITS/L (ref 17–59)
BASOPHILS ABSOLUTE: 0.06 X10E3/?L (ref 0–0.3)
BASOPHILS RELATIVE PERCENT: 1 % (ref 0–3)
BILIRUB SERPL-MCNC: 0.9 MG/DL (ref 0.2–1.3)
BUN BLDV-MCNC: 15 MG/DL (ref 9–20)
CALCIUM SERPL-MCNC: 9.5 MG/DL (ref 8.4–10.2)
CHLORIDE BLD-SCNC: 105 MMOL/L (ref 98–120)
CHOLESTEROL, TOTAL: 244 MG/DL (ref 50–200)
CHOLESTEROL/HDL RATIO: 5.08 RATIO (ref 0–4.5)
CO2: 26 MMOL/L (ref 22–31)
CREAT SERPL-MCNC: 0.8 MG/DL (ref 0.7–1.3)
CREATININE, RANDOM URINE: 250.3 MG/DL (ref 20–370)
EOSINOPHILS ABSOLUTE: 0.27 X10E3/?L (ref 0–1.1)
EOSINOPHILS RELATIVE PERCENT: 4.62 % (ref 0–10)
GFR, ESTIMATED: > 60
GLOBULIN: 2.8 G/DL
GLUCOSE: 141 MG/DL (ref 75–110)
HCT VFR BLD CALC: 50.5 % (ref 42–52)
HDLC SERPL-MCNC: 48 MG/DL (ref 36–68)
HEMOGLOBIN: 16.4 G/DL (ref 13.8–17.8)
LDL CHOLESTEROL: 170.2 MG/DL (ref 0–160)
LYMPHOCYTES ABSOLUTE: 1.68 X10E3/?L (ref 1–5.5)
LYMPHOCYTES RELATIVE PERCENT: 28.36 % (ref 20–51.1)
MCH RBC QN AUTO: 29.9 PG (ref 28.5–32.5)
MCHC RBC AUTO-ENTMCNC: 32.5 G/DL (ref 32–37)
MCV RBC AUTO: 92.2 FL (ref 80–94)
MICROALBUMIN/CREAT 24H UR: 33.5 MG/DL (ref 0–1.7)
MICROALBUMIN/CREAT UR-RTO: 133.83
MONOCYTES ABSOLUTE: 0.55 X10E3/?L (ref 0.1–1)
MONOCYTES RELATIVE PERCENT: 9.36 % (ref 1.7–9.3)
NEUTROPHILS ABSOLUTE: 3.35 X10E3/?L (ref 2–8.1)
NEUTROPHILS RELATIVE PERCENT: 56.67 % (ref 42.2–75.2)
PDW BLD-RTO: 12.4 % (ref 10–15.5)
PLATELET # BLD: 280.5 THOU/MM3 (ref 130–400)
POTASSIUM SERPL-SCNC: 3.6 MMOL/L (ref 3.6–5)
RBC # BLD: 5.48 M/UL (ref 4.7–6.1)
SODIUM BLD-SCNC: 143 MMOL/L (ref 135–145)
TOTAL PROTEIN: 7.3 G/DL (ref 6.3–8.2)
TRIGL SERPL-MCNC: 129 MG/DL (ref 10–250)
VLDLC SERPL CALC-MCNC: 26 MG/DL (ref 0–50)
WBC # BLD: 5.9 THOU/ML3 (ref 4.8–10.8)

## 2025-06-25 DIAGNOSIS — I10 ESSENTIAL HYPERTENSION: ICD-10-CM

## 2025-06-25 DIAGNOSIS — R73.01 IMPAIRED FASTING GLUCOSE: ICD-10-CM

## 2025-06-25 NOTE — TELEPHONE ENCOUNTER
Jurgen Shah is requesting a refill on the following medication(s):  Requested Prescriptions     Pending Prescriptions Disp Refills    lisinopril (PRINIVIL;ZESTRIL) 10 MG tablet [Pharmacy Med Name: Lisinopril 10 MG Oral Tablet] 90 tablet 0     Sig: Take 1 tablet by mouth once daily       Last Visit Date (If Applicable):  7/17/2024      Next Visit Date:    7/1/2025

## 2025-06-26 ENCOUNTER — RESULTS FOLLOW-UP (OUTPATIENT)
Dept: FAMILY MEDICINE CLINIC | Age: 65
End: 2025-06-26

## 2025-06-26 RX ORDER — LISINOPRIL 10 MG/1
10 TABLET ORAL DAILY
Qty: 90 TABLET | Refills: 0 | Status: SHIPPED | OUTPATIENT
Start: 2025-06-26

## 2025-06-29 SDOH — ECONOMIC STABILITY: FOOD INSECURITY: WITHIN THE PAST 12 MONTHS, THE FOOD YOU BOUGHT JUST DIDN'T LAST AND YOU DIDN'T HAVE MONEY TO GET MORE.: NEVER TRUE

## 2025-06-29 SDOH — HEALTH STABILITY: PHYSICAL HEALTH: ON AVERAGE, HOW MANY DAYS PER WEEK DO YOU ENGAGE IN MODERATE TO STRENUOUS EXERCISE (LIKE A BRISK WALK)?: 3 DAYS

## 2025-06-29 SDOH — ECONOMIC STABILITY: INCOME INSECURITY: IN THE LAST 12 MONTHS, WAS THERE A TIME WHEN YOU WERE NOT ABLE TO PAY THE MORTGAGE OR RENT ON TIME?: NO

## 2025-06-29 SDOH — ECONOMIC STABILITY: FOOD INSECURITY: WITHIN THE PAST 12 MONTHS, YOU WORRIED THAT YOUR FOOD WOULD RUN OUT BEFORE YOU GOT MONEY TO BUY MORE.: NEVER TRUE

## 2025-06-29 SDOH — HEALTH STABILITY: PHYSICAL HEALTH: ON AVERAGE, HOW MANY MINUTES DO YOU ENGAGE IN EXERCISE AT THIS LEVEL?: 20 MIN

## 2025-06-29 ASSESSMENT — LIFESTYLE VARIABLES
HOW OFTEN DO YOU HAVE SIX OR MORE DRINKS ON ONE OCCASION: 1
HOW MANY STANDARD DRINKS CONTAINING ALCOHOL DO YOU HAVE ON A TYPICAL DAY: 1
HOW OFTEN DO YOU HAVE A DRINK CONTAINING ALCOHOL: 3
HOW OFTEN DO YOU HAVE A DRINK CONTAINING ALCOHOL: 2-4 TIMES A MONTH
HOW MANY STANDARD DRINKS CONTAINING ALCOHOL DO YOU HAVE ON A TYPICAL DAY: 1 OR 2

## 2025-06-29 ASSESSMENT — PATIENT HEALTH QUESTIONNAIRE - PHQ9
SUM OF ALL RESPONSES TO PHQ QUESTIONS 1-9: 0
2. FEELING DOWN, DEPRESSED OR HOPELESS: NOT AT ALL
SUM OF ALL RESPONSES TO PHQ QUESTIONS 1-9: 0
SUM OF ALL RESPONSES TO PHQ QUESTIONS 1-9: 0
1. LITTLE INTEREST OR PLEASURE IN DOING THINGS: NOT AT ALL
SUM OF ALL RESPONSES TO PHQ QUESTIONS 1-9: 0

## 2025-07-01 ENCOUNTER — OFFICE VISIT (OUTPATIENT)
Dept: FAMILY MEDICINE CLINIC | Age: 65
End: 2025-07-01
Payer: MEDICARE

## 2025-07-01 VITALS
BODY MASS INDEX: 31.28 KG/M2 | SYSTOLIC BLOOD PRESSURE: 148 MMHG | HEART RATE: 67 BPM | HEIGHT: 69 IN | DIASTOLIC BLOOD PRESSURE: 98 MMHG | WEIGHT: 211.2 LBS | OXYGEN SATURATION: 97 %

## 2025-07-01 DIAGNOSIS — E78.2 MIXED HYPERLIPIDEMIA: ICD-10-CM

## 2025-07-01 DIAGNOSIS — R73.01 IMPAIRED FASTING GLUCOSE: ICD-10-CM

## 2025-07-01 DIAGNOSIS — Z00.00 MEDICARE ANNUAL WELLNESS VISIT, SUBSEQUENT: Primary | ICD-10-CM

## 2025-07-01 DIAGNOSIS — I10 ESSENTIAL HYPERTENSION: ICD-10-CM

## 2025-07-01 PROBLEM — C11.1: Status: RESOLVED | Noted: 2017-06-26 | Resolved: 2025-07-01

## 2025-07-01 LAB — HBA1C MFR BLD: 6.3 %

## 2025-07-01 PROCEDURE — G0439 PPPS, SUBSEQ VISIT: HCPCS | Performed by: NURSE PRACTITIONER

## 2025-07-01 PROCEDURE — 1160F RVW MEDS BY RX/DR IN RCRD: CPT | Performed by: NURSE PRACTITIONER

## 2025-07-01 PROCEDURE — PBSHW POCT GLYCOSYLATED HEMOGLOBIN (HGB A1C): Performed by: NURSE PRACTITIONER

## 2025-07-01 PROCEDURE — 3077F SYST BP >= 140 MM HG: CPT | Performed by: NURSE PRACTITIONER

## 2025-07-01 PROCEDURE — 99213 OFFICE O/P EST LOW 20 MIN: CPT | Performed by: NURSE PRACTITIONER

## 2025-07-01 PROCEDURE — 1123F ACP DISCUSS/DSCN MKR DOCD: CPT | Performed by: NURSE PRACTITIONER

## 2025-07-01 PROCEDURE — 1159F MED LIST DOCD IN RCRD: CPT | Performed by: NURSE PRACTITIONER

## 2025-07-01 PROCEDURE — 83036 HEMOGLOBIN GLYCOSYLATED A1C: CPT | Performed by: NURSE PRACTITIONER

## 2025-07-01 PROCEDURE — 3080F DIAST BP >= 90 MM HG: CPT | Performed by: NURSE PRACTITIONER

## 2025-07-01 RX ORDER — LISINOPRIL 20 MG/1
20 TABLET ORAL DAILY
Qty: 90 TABLET | Refills: 2 | Status: SHIPPED | OUTPATIENT
Start: 2025-07-01

## 2025-07-01 RX ORDER — ATORVASTATIN CALCIUM 20 MG/1
20 TABLET, FILM COATED ORAL NIGHTLY
Qty: 30 TABLET | Refills: 5 | Status: SHIPPED | OUTPATIENT
Start: 2025-07-01

## 2025-07-01 NOTE — PROGRESS NOTES
HDL Cholesterol: 48 mg/dL      Total Cholesterol: 244 mg/dL     Patient's complete Health Risk Assessment and screening values have been reviewed and are found in Flowsheets. The following problems were reviewed today and where indicated follow up appointments were made and/or referrals ordered.    Positive Risk Factor Screenings with Interventions:                Abnormal BMI (obese):  Body mass index is 31.62 kg/m². (!) Abnormal  Interventions:  Patient comments: walks the dog, yard work.   Patient declines any further evaluation or treatment         Safety:  Do you have non-slip mats or non-slip surfaces or shower bars or grab bars in your shower or bathtub?: (!) No  Interventions:  Patient declined any further interventions or treatment     Advanced Directives:  Do you have a Living Will?: (!) No    Intervention:  has NO advanced directive - not interested in additional information  Planning on taking care of this in the fall.     Advance Care Planning   Discussed the patient’s choices for care and treatment preferences in case of a health event that adversely affects decision-making abilities or is life-limiting. Recommended the patient document care preferences in state-specific advance directives. Also reviewed the process of designating a trusted capable adult as an Agent (or Health Care Power of ) to make health care decisions for the patient if the patient becomes unable due to incapacity. Patient was asked to complete advance directive forms, if not already done, and to provide a dated, signed and witnessed (or notarized) copy, per the forms' requirements, to the practice office.    Time spent (minutes): <16 minutes (Non-Billable)        CV Risk Counseling:  Patient was asked about his current diet and exercise habits, and personalized advice was provided regarding recommended lifestyle changes. Patient's individual cardiovascular disease risk factors, including advanced age (> 55 for men, > 65

## 2025-07-01 NOTE — PATIENT INSTRUCTIONS
chest.     Sweating.     Shortness of breath.     Pain, pressure, or a strange feeling in the back, neck, jaw, or upper belly or in one or both shoulders or arms.     Lightheadedness or sudden weakness.     A fast or irregular heartbeat.   After you call 911, the  may tell you to chew 1 adult-strength or 2 to 4 low-dose aspirin. Wait for an ambulance. Do not try to drive yourself.  Watch closely for changes in your health, and be sure to contact your doctor if you have any problems.  Where can you learn more?  Go to https://www.[employer].net/patientEd and enter F075 to learn more about \"A Healthy Heart: Care Instructions.\"  Current as of: July 31, 2024  Content Version: 14.5  © 4466-7904 [employer].   Care instructions adapted under license by [employer]. If you have questions about a medical condition or this instruction, always ask your healthcare professional. [employer] disclaims any warranty or liability for your use of this information.    Personalized Preventive Plan for Jurgen Shah - 7/1/2025  Medicare offers a range of preventive health benefits. Some of the tests and screenings are paid in full while other may be subject to a deductible, co-insurance, and/or copay.  Some of these benefits include a comprehensive review of your medical history including lifestyle, illnesses that may run in your family, and various assessments and screenings as appropriate.  After reviewing your medical record and screening and assessments performed today your provider may have ordered immunizations, labs, imaging, and/or referrals for you.  A list of these orders (if applicable) as well as your Preventive Care list are included within your After Visit Summary for your review.

## 2025-07-21 DIAGNOSIS — E78.2 MIXED HYPERLIPIDEMIA: ICD-10-CM

## 2025-07-21 RX ORDER — ATORVASTATIN CALCIUM 20 MG/1
20 TABLET, FILM COATED ORAL NIGHTLY
Qty: 100 TABLET | Refills: 1 | Status: SHIPPED | OUTPATIENT
Start: 2025-07-21

## 2025-07-21 NOTE — TELEPHONE ENCOUNTER
Jurgen Shah is requesting a refill on the following medication(s):  Requested Prescriptions     Pending Prescriptions Disp Refills    atorvastatin (LIPITOR) 20 MG tablet 100 tablet 1     Sig: Take 1 tablet by mouth at bedtime       Last Visit Date (If Applicable):  7/1/2025      Next Visit Date:    7/29/2025

## 2025-07-29 ENCOUNTER — OFFICE VISIT (OUTPATIENT)
Dept: FAMILY MEDICINE CLINIC | Age: 65
End: 2025-07-29
Payer: MEDICARE

## 2025-07-29 VITALS
WEIGHT: 211.2 LBS | DIASTOLIC BLOOD PRESSURE: 86 MMHG | OXYGEN SATURATION: 97 % | SYSTOLIC BLOOD PRESSURE: 128 MMHG | BODY MASS INDEX: 31.62 KG/M2 | HEART RATE: 62 BPM

## 2025-07-29 DIAGNOSIS — I10 ESSENTIAL HYPERTENSION: Primary | ICD-10-CM

## 2025-07-29 PROCEDURE — 99212 OFFICE O/P EST SF 10 MIN: CPT | Performed by: NURSE PRACTITIONER

## 2025-07-29 NOTE — PROGRESS NOTES
[place] [id number] [facility], [address]  [place] [address]  Dept: [phone]  Dept Fax: [phone]    Date of Service:  7/29/2025    Jurgen Shah is a 64 y.o. male who presents today for his medical conditions/complaints as noted below.      Chief Complaint   Patient presents with    Hypertension     1 mo f/u, reports has been monitoring BP at home and has seen decrease since medication increase      DIAGNOSIS / PLAN:     Assessment & Plan  1. Hypertension: Stable.  - Blood pressure readings have shown significant improvement, with today's reading at 128/86 mmHg, down from 148/98 mmHg.  - Blood pressure at home ranges from low 130s to mid 120s.  - No side effects reported from the current medication regimen.  - Maintain current treatment plan.       Essential hypertension  Assessment & Plan:   Chronic, at goal (stable), continue current treatment plan     Blood pressure is currently well controlled on current prescribed medical therapy. Discussed both the desired effects and potential side effects of the medication. Additionally, non-pharmacological approaches such as adhering to a low-salt diet and increasing water intake were reviewed.     Return in about 6 months (around 1/29/2026) for IFG, HTN, HLD.    SUBJECTIVE:     History of Present Illness  The patient presents for hypertension and diabetes.    They have been monitoring their blood pressure at home, which has consistently been in the low 130s to mid-120s range. They report no noticeable side effects from their current medication regimen.    They have recently started a new medication for their diabetes and are tolerating it well. However, they have not observed any weight loss since starting the medication.      BP Readings from Last 3 Encounters:   07/29/25 128/86   07/01/25 (!) 148/98   07/17/24 128/72            Wt Readings from Last 3 Encounters:   07/29/25 95.8 kg (211 lb 3.2 oz)   07/01/25 95.8 kg (211 lb

## 2025-08-21 DIAGNOSIS — I10 ESSENTIAL HYPERTENSION: ICD-10-CM

## 2025-08-21 RX ORDER — AMLODIPINE BESYLATE 10 MG/1
10 TABLET ORAL NIGHTLY
Qty: 90 TABLET | Refills: 3 | Status: SHIPPED | OUTPATIENT
Start: 2025-08-21

## 2025-08-21 RX ORDER — METOPROLOL SUCCINATE 100 MG/1
100 TABLET, EXTENDED RELEASE ORAL DAILY
Qty: 90 TABLET | Refills: 3 | Status: SHIPPED | OUTPATIENT
Start: 2025-08-21